# Patient Record
Sex: MALE | Race: WHITE | NOT HISPANIC OR LATINO | Employment: OTHER | URBAN - METROPOLITAN AREA
[De-identification: names, ages, dates, MRNs, and addresses within clinical notes are randomized per-mention and may not be internally consistent; named-entity substitution may affect disease eponyms.]

---

## 2024-01-01 ENCOUNTER — TRANSITIONAL CARE UNIT VISIT (OUTPATIENT)
Dept: GERIATRICS | Facility: CLINIC | Age: 89
End: 2024-01-01
Payer: MEDICARE

## 2024-01-01 ENCOUNTER — LAB REQUISITION (OUTPATIENT)
Dept: LAB | Facility: CLINIC | Age: 89
End: 2024-01-01
Payer: MEDICARE

## 2024-01-01 ENCOUNTER — NURSING HOME VISIT (OUTPATIENT)
Dept: GERIATRICS | Facility: CLINIC | Age: 89
End: 2024-01-01
Payer: MEDICARE

## 2024-01-01 ENCOUNTER — TELEPHONE (OUTPATIENT)
Dept: GERIATRICS | Facility: CLINIC | Age: 89
End: 2024-01-01
Payer: MEDICARE

## 2024-01-01 ENCOUNTER — MEDICAL CORRESPONDENCE (OUTPATIENT)
Dept: HEALTH INFORMATION MANAGEMENT | Facility: CLINIC | Age: 89
End: 2024-01-01
Payer: MEDICARE

## 2024-01-01 ENCOUNTER — TELEPHONE (OUTPATIENT)
Dept: FAMILY MEDICINE | Facility: OTHER | Age: 89
End: 2024-01-01
Payer: MEDICARE

## 2024-01-01 ENCOUNTER — NURSING HOME VISIT (OUTPATIENT)
Dept: GERIATRICS | Facility: CLINIC | Age: 89
End: 2024-01-01
Payer: COMMERCIAL

## 2024-01-01 ENCOUNTER — PATIENT OUTREACH (OUTPATIENT)
Dept: GERIATRIC MEDICINE | Facility: CLINIC | Age: 89
End: 2024-01-01
Payer: MEDICARE

## 2024-01-01 ENCOUNTER — DOCUMENTATION ONLY (OUTPATIENT)
Dept: OTHER | Facility: CLINIC | Age: 89
End: 2024-01-01
Payer: MEDICARE

## 2024-01-01 ENCOUNTER — DOCUMENTATION ONLY (OUTPATIENT)
Dept: GERIATRICS | Facility: CLINIC | Age: 89
End: 2024-01-01
Payer: MEDICARE

## 2024-01-01 ENCOUNTER — LAB REQUISITION (OUTPATIENT)
Dept: LAB | Facility: CLINIC | Age: 89
End: 2024-01-01
Payer: COMMERCIAL

## 2024-01-01 ENCOUNTER — DOCUMENTATION ONLY (OUTPATIENT)
Dept: GERIATRICS | Facility: CLINIC | Age: 89
End: 2024-01-01

## 2024-01-01 ENCOUNTER — TELEPHONE (OUTPATIENT)
Dept: GERIATRICS | Facility: CLINIC | Age: 89
End: 2024-01-01

## 2024-01-01 VITALS
OXYGEN SATURATION: 90 % | RESPIRATION RATE: 18 BRPM | HEART RATE: 56 BPM | WEIGHT: 173.2 LBS | SYSTOLIC BLOOD PRESSURE: 147 MMHG | TEMPERATURE: 97.8 F | BODY MASS INDEX: 25.58 KG/M2 | DIASTOLIC BLOOD PRESSURE: 62 MMHG

## 2024-01-01 VITALS
HEART RATE: 88 BPM | TEMPERATURE: 98 F | DIASTOLIC BLOOD PRESSURE: 76 MMHG | HEIGHT: 69 IN | RESPIRATION RATE: 18 BRPM | OXYGEN SATURATION: 95 % | WEIGHT: 171.4 LBS | SYSTOLIC BLOOD PRESSURE: 109 MMHG | BODY MASS INDEX: 25.39 KG/M2

## 2024-01-01 VITALS
WEIGHT: 171 LBS | TEMPERATURE: 97.3 F | DIASTOLIC BLOOD PRESSURE: 59 MMHG | BODY MASS INDEX: 25.25 KG/M2 | HEART RATE: 59 BPM | RESPIRATION RATE: 16 BRPM | OXYGEN SATURATION: 95 % | SYSTOLIC BLOOD PRESSURE: 102 MMHG

## 2024-01-01 VITALS
HEIGHT: 69 IN | DIASTOLIC BLOOD PRESSURE: 70 MMHG | HEART RATE: 70 BPM | WEIGHT: 171.4 LBS | OXYGEN SATURATION: 96 % | BODY MASS INDEX: 25.39 KG/M2 | SYSTOLIC BLOOD PRESSURE: 165 MMHG | TEMPERATURE: 97.5 F | RESPIRATION RATE: 15 BRPM

## 2024-01-01 VITALS
DIASTOLIC BLOOD PRESSURE: 63 MMHG | SYSTOLIC BLOOD PRESSURE: 133 MMHG | RESPIRATION RATE: 15 BRPM | TEMPERATURE: 97.5 F | WEIGHT: 174 LBS | HEART RATE: 64 BPM | BODY MASS INDEX: 25.7 KG/M2 | OXYGEN SATURATION: 98 %

## 2024-01-01 VITALS
DIASTOLIC BLOOD PRESSURE: 66 MMHG | HEART RATE: 76 BPM | OXYGEN SATURATION: 95 % | WEIGHT: 186.4 LBS | RESPIRATION RATE: 16 BRPM | SYSTOLIC BLOOD PRESSURE: 121 MMHG | BODY MASS INDEX: 27.53 KG/M2 | TEMPERATURE: 97.7 F

## 2024-01-01 VITALS
HEART RATE: 61 BPM | RESPIRATION RATE: 18 BRPM | BODY MASS INDEX: 25.58 KG/M2 | SYSTOLIC BLOOD PRESSURE: 154 MMHG | OXYGEN SATURATION: 96 % | WEIGHT: 173.2 LBS | DIASTOLIC BLOOD PRESSURE: 72 MMHG | TEMPERATURE: 97.7 F

## 2024-01-01 VITALS
OXYGEN SATURATION: 91 % | WEIGHT: 164 LBS | SYSTOLIC BLOOD PRESSURE: 117 MMHG | HEART RATE: 66 BPM | RESPIRATION RATE: 15 BRPM | TEMPERATURE: 97.8 F | BODY MASS INDEX: 24.22 KG/M2 | DIASTOLIC BLOOD PRESSURE: 56 MMHG

## 2024-01-01 VITALS
SYSTOLIC BLOOD PRESSURE: 133 MMHG | TEMPERATURE: 97.5 F | RESPIRATION RATE: 15 BRPM | HEART RATE: 64 BPM | DIASTOLIC BLOOD PRESSURE: 63 MMHG | BODY MASS INDEX: 24.29 KG/M2 | HEIGHT: 69 IN | OXYGEN SATURATION: 98 % | WEIGHT: 164 LBS

## 2024-01-01 VITALS
RESPIRATION RATE: 16 BRPM | TEMPERATURE: 97.3 F | DIASTOLIC BLOOD PRESSURE: 63 MMHG | HEART RATE: 80 BPM | OXYGEN SATURATION: 95 % | WEIGHT: 173.2 LBS | BODY MASS INDEX: 25.58 KG/M2 | SYSTOLIC BLOOD PRESSURE: 125 MMHG

## 2024-01-01 VITALS
TEMPERATURE: 97.3 F | HEART RATE: 62 BPM | SYSTOLIC BLOOD PRESSURE: 154 MMHG | DIASTOLIC BLOOD PRESSURE: 66 MMHG | RESPIRATION RATE: 16 BRPM | BODY MASS INDEX: 24.69 KG/M2 | OXYGEN SATURATION: 97 % | WEIGHT: 167.2 LBS

## 2024-01-01 VITALS
HEART RATE: 64 BPM | DIASTOLIC BLOOD PRESSURE: 63 MMHG | WEIGHT: 166.8 LBS | BODY MASS INDEX: 24.63 KG/M2 | OXYGEN SATURATION: 98 % | RESPIRATION RATE: 15 BRPM | TEMPERATURE: 97.5 F | SYSTOLIC BLOOD PRESSURE: 133 MMHG

## 2024-01-01 VITALS
OXYGEN SATURATION: 92 % | BODY MASS INDEX: 26.99 KG/M2 | DIASTOLIC BLOOD PRESSURE: 66 MMHG | SYSTOLIC BLOOD PRESSURE: 130 MMHG | WEIGHT: 182.8 LBS | HEART RATE: 65 BPM | RESPIRATION RATE: 18 BRPM | TEMPERATURE: 97.3 F

## 2024-01-01 VITALS
DIASTOLIC BLOOD PRESSURE: 75 MMHG | WEIGHT: 173.2 LBS | RESPIRATION RATE: 22 BRPM | SYSTOLIC BLOOD PRESSURE: 150 MMHG | HEART RATE: 78 BPM | BODY MASS INDEX: 25.58 KG/M2 | TEMPERATURE: 97.6 F | OXYGEN SATURATION: 97 %

## 2024-01-01 VITALS
HEART RATE: 70 BPM | RESPIRATION RATE: 16 BRPM | SYSTOLIC BLOOD PRESSURE: 112 MMHG | OXYGEN SATURATION: 91 % | DIASTOLIC BLOOD PRESSURE: 56 MMHG | BODY MASS INDEX: 25.8 KG/M2 | WEIGHT: 174.2 LBS | HEIGHT: 69 IN | TEMPERATURE: 97.3 F

## 2024-01-01 VITALS
OXYGEN SATURATION: 97 % | HEART RATE: 62 BPM | DIASTOLIC BLOOD PRESSURE: 66 MMHG | SYSTOLIC BLOOD PRESSURE: 154 MMHG | WEIGHT: 167.2 LBS | TEMPERATURE: 97.3 F | RESPIRATION RATE: 16 BRPM | BODY MASS INDEX: 24.69 KG/M2

## 2024-01-01 VITALS
WEIGHT: 164 LBS | DIASTOLIC BLOOD PRESSURE: 52 MMHG | SYSTOLIC BLOOD PRESSURE: 116 MMHG | OXYGEN SATURATION: 95 % | RESPIRATION RATE: 17 BRPM | HEART RATE: 79 BPM | BODY MASS INDEX: 24.22 KG/M2 | TEMPERATURE: 97.1 F

## 2024-01-01 VITALS
HEIGHT: 69 IN | DIASTOLIC BLOOD PRESSURE: 59 MMHG | SYSTOLIC BLOOD PRESSURE: 114 MMHG | BODY MASS INDEX: 25.65 KG/M2 | RESPIRATION RATE: 17 BRPM | OXYGEN SATURATION: 96 % | TEMPERATURE: 97.9 F | HEART RATE: 66 BPM | WEIGHT: 173.2 LBS

## 2024-01-01 VITALS
WEIGHT: 174 LBS | BODY MASS INDEX: 25.7 KG/M2 | SYSTOLIC BLOOD PRESSURE: 133 MMHG | OXYGEN SATURATION: 98 % | TEMPERATURE: 97.5 F | DIASTOLIC BLOOD PRESSURE: 63 MMHG | RESPIRATION RATE: 15 BRPM | HEART RATE: 64 BPM

## 2024-01-01 VITALS
TEMPERATURE: 98 F | OXYGEN SATURATION: 95 % | WEIGHT: 173.2 LBS | BODY MASS INDEX: 25.58 KG/M2 | RESPIRATION RATE: 16 BRPM | DIASTOLIC BLOOD PRESSURE: 69 MMHG | SYSTOLIC BLOOD PRESSURE: 138 MMHG | HEART RATE: 72 BPM

## 2024-01-01 VITALS
SYSTOLIC BLOOD PRESSURE: 120 MMHG | RESPIRATION RATE: 14 BRPM | TEMPERATURE: 97.7 F | WEIGHT: 180.4 LBS | OXYGEN SATURATION: 91 % | BODY MASS INDEX: 26.64 KG/M2 | HEART RATE: 69 BPM | DIASTOLIC BLOOD PRESSURE: 61 MMHG

## 2024-01-01 VITALS
RESPIRATION RATE: 16 BRPM | OXYGEN SATURATION: 90 % | WEIGHT: 183 LBS | BODY MASS INDEX: 27.02 KG/M2 | HEART RATE: 74 BPM | DIASTOLIC BLOOD PRESSURE: 61 MMHG | TEMPERATURE: 97.2 F | SYSTOLIC BLOOD PRESSURE: 128 MMHG

## 2024-01-01 VITALS
WEIGHT: 176.4 LBS | BODY MASS INDEX: 26.05 KG/M2 | TEMPERATURE: 97.5 F | SYSTOLIC BLOOD PRESSURE: 131 MMHG | OXYGEN SATURATION: 95 % | DIASTOLIC BLOOD PRESSURE: 72 MMHG | RESPIRATION RATE: 18 BRPM | HEART RATE: 81 BPM

## 2024-01-01 VITALS
OXYGEN SATURATION: 95 % | BODY MASS INDEX: 25.55 KG/M2 | RESPIRATION RATE: 16 BRPM | WEIGHT: 173 LBS | TEMPERATURE: 97.3 F | DIASTOLIC BLOOD PRESSURE: 63 MMHG | HEART RATE: 80 BPM | SYSTOLIC BLOOD PRESSURE: 125 MMHG

## 2024-01-01 VITALS
OXYGEN SATURATION: 97 % | TEMPERATURE: 97.5 F | RESPIRATION RATE: 18 BRPM | HEART RATE: 62 BPM | SYSTOLIC BLOOD PRESSURE: 129 MMHG | DIASTOLIC BLOOD PRESSURE: 59 MMHG | WEIGHT: 181 LBS | BODY MASS INDEX: 26.73 KG/M2

## 2024-01-01 VITALS
SYSTOLIC BLOOD PRESSURE: 122 MMHG | HEART RATE: 79 BPM | DIASTOLIC BLOOD PRESSURE: 66 MMHG | OXYGEN SATURATION: 99 % | BODY MASS INDEX: 25.1 KG/M2 | RESPIRATION RATE: 18 BRPM | TEMPERATURE: 97.7 F | WEIGHT: 170 LBS

## 2024-01-01 VITALS
SYSTOLIC BLOOD PRESSURE: 142 MMHG | DIASTOLIC BLOOD PRESSURE: 99 MMHG | BODY MASS INDEX: 25.65 KG/M2 | WEIGHT: 173.2 LBS | RESPIRATION RATE: 16 BRPM | HEART RATE: 70 BPM | OXYGEN SATURATION: 93 % | HEIGHT: 69 IN | TEMPERATURE: 98.2 F

## 2024-01-01 VITALS
TEMPERATURE: 97.3 F | OXYGEN SATURATION: 95 % | DIASTOLIC BLOOD PRESSURE: 59 MMHG | SYSTOLIC BLOOD PRESSURE: 102 MMHG | HEART RATE: 59 BPM | BODY MASS INDEX: 25.33 KG/M2 | RESPIRATION RATE: 16 BRPM | WEIGHT: 171 LBS | HEIGHT: 69 IN

## 2024-01-01 DIAGNOSIS — G81.94 LEFT HEMIPLEGIA (H): ICD-10-CM

## 2024-01-01 DIAGNOSIS — I63.9 CEREBROVASCULAR ACCIDENT (CVA), UNSPECIFIED MECHANISM (H): Primary | ICD-10-CM

## 2024-01-01 DIAGNOSIS — I48.0 PAROXYSMAL ATRIAL FIBRILLATION (H): ICD-10-CM

## 2024-01-01 DIAGNOSIS — D72.829 LEUKOCYTOSIS, UNSPECIFIED TYPE: ICD-10-CM

## 2024-01-01 DIAGNOSIS — G81.94 LEFT HEMIPLEGIA (H): Primary | ICD-10-CM

## 2024-01-01 DIAGNOSIS — R41.0 CONFUSION: ICD-10-CM

## 2024-01-01 DIAGNOSIS — R52 GENERALIZED PAIN: ICD-10-CM

## 2024-01-01 DIAGNOSIS — I10 HYPERTENSION, UNSPECIFIED TYPE: ICD-10-CM

## 2024-01-01 DIAGNOSIS — R53.83 LETHARGY: ICD-10-CM

## 2024-01-01 DIAGNOSIS — I10 ESSENTIAL HYPERTENSION: ICD-10-CM

## 2024-01-01 DIAGNOSIS — G47.00 INSOMNIA, UNSPECIFIED TYPE: ICD-10-CM

## 2024-01-01 DIAGNOSIS — R60.9 EDEMA, UNSPECIFIED TYPE: ICD-10-CM

## 2024-01-01 DIAGNOSIS — L29.9 ITCHING: ICD-10-CM

## 2024-01-01 DIAGNOSIS — R76.12 NONSPECIFIC REACTION TO CELL MEDIATED IMMUNITY MEASUREMENT OF GAMMA INTERFERON ANTIGEN RESPONSE WITHOUT ACTIVE TUBERCULOSIS: ICD-10-CM

## 2024-01-01 DIAGNOSIS — R44.3 HALLUCINATIONS: ICD-10-CM

## 2024-01-01 DIAGNOSIS — R13.10 DYSPHAGIA, UNSPECIFIED TYPE: ICD-10-CM

## 2024-01-01 DIAGNOSIS — R05.2 SUBACUTE COUGH: ICD-10-CM

## 2024-01-01 DIAGNOSIS — D64.9 ANEMIA, UNSPECIFIED: ICD-10-CM

## 2024-01-01 DIAGNOSIS — E78.5 HYPERLIPIDEMIA, UNSPECIFIED HYPERLIPIDEMIA TYPE: ICD-10-CM

## 2024-01-01 DIAGNOSIS — R40.4 UNRESPONSIVE EPISODE: ICD-10-CM

## 2024-01-01 DIAGNOSIS — Z51.5 HOSPICE CARE PATIENT: ICD-10-CM

## 2024-01-01 DIAGNOSIS — R04.0 EPISTAXIS: ICD-10-CM

## 2024-01-01 DIAGNOSIS — N18.9 CHRONIC KIDNEY DISEASE, UNSPECIFIED: ICD-10-CM

## 2024-01-01 DIAGNOSIS — M62.81 GENERALIZED MUSCLE WEAKNESS: ICD-10-CM

## 2024-01-01 DIAGNOSIS — R63.4 WEIGHT LOSS: ICD-10-CM

## 2024-01-01 DIAGNOSIS — K59.01 SLOW TRANSIT CONSTIPATION: ICD-10-CM

## 2024-01-01 DIAGNOSIS — F32.4 MAJOR DEPRESSIVE DISORDER IN PARTIAL REMISSION, UNSPECIFIED WHETHER RECURRENT (H): ICD-10-CM

## 2024-01-01 DIAGNOSIS — I48.0 PAROXYSMAL ATRIAL FIBRILLATION (H): Primary | ICD-10-CM

## 2024-01-01 LAB
ANION GAP SERPL CALCULATED.3IONS-SCNC: 10 MMOL/L (ref 7–15)
ANION GAP SERPL CALCULATED.3IONS-SCNC: 11 MMOL/L (ref 7–15)
ANION GAP SERPL CALCULATED.3IONS-SCNC: 14 MMOL/L (ref 7–15)
BUN SERPL-MCNC: 19.1 MG/DL (ref 8–23)
BUN SERPL-MCNC: 22.3 MG/DL (ref 8–23)
BUN SERPL-MCNC: 30 MG/DL (ref 8–23)
CALCIUM SERPL-MCNC: 9.6 MG/DL (ref 8.2–9.6)
CALCIUM SERPL-MCNC: 9.9 MG/DL (ref 8.2–9.6)
CALCIUM SERPL-MCNC: 9.9 MG/DL (ref 8.8–10.4)
CHLORIDE SERPL-SCNC: 100 MMOL/L (ref 98–107)
CHLORIDE SERPL-SCNC: 101 MMOL/L (ref 98–107)
CHLORIDE SERPL-SCNC: 103 MMOL/L (ref 98–107)
CREAT SERPL-MCNC: 0.98 MG/DL (ref 0.67–1.17)
CREAT SERPL-MCNC: 1.07 MG/DL (ref 0.67–1.17)
CREAT SERPL-MCNC: 1.13 MG/DL (ref 0.67–1.17)
DEPRECATED HCO3 PLAS-SCNC: 22 MMOL/L (ref 22–29)
DEPRECATED HCO3 PLAS-SCNC: 25 MMOL/L (ref 22–29)
EGFRCR SERPLBLD CKD-EPI 2021: 60 ML/MIN/1.73M2
EGFRCR SERPLBLD CKD-EPI 2021: 64 ML/MIN/1.73M2
EGFRCR SERPLBLD CKD-EPI 2021: 71 ML/MIN/1.73M2
ERYTHROCYTE [DISTWIDTH] IN BLOOD BY AUTOMATED COUNT: 13 % (ref 10–15)
ERYTHROCYTE [DISTWIDTH] IN BLOOD BY AUTOMATED COUNT: 14.2 % (ref 10–15)
ERYTHROCYTE [DISTWIDTH] IN BLOOD BY AUTOMATED COUNT: 14.3 % (ref 10–15)
GAMMA INTERFERON BACKGROUND BLD IA-ACNC: 0.05 IU/ML
GLUCOSE SERPL-MCNC: 74 MG/DL (ref 70–99)
GLUCOSE SERPL-MCNC: 80 MG/DL (ref 70–99)
GLUCOSE SERPL-MCNC: 91 MG/DL (ref 70–99)
HCO3 SERPL-SCNC: 19 MMOL/L (ref 22–29)
HCT VFR BLD AUTO: 40 % (ref 40–53)
HCT VFR BLD AUTO: 45 % (ref 40–53)
HCT VFR BLD AUTO: 45.7 % (ref 40–53)
HGB BLD-MCNC: 12.9 G/DL (ref 13.3–17.7)
HGB BLD-MCNC: 14.5 G/DL (ref 13.3–17.7)
HGB BLD-MCNC: 14.8 G/DL (ref 13.3–17.7)
M TB IFN-G BLD-IMP: NEGATIVE
M TB IFN-G CD4+ BCKGRND COR BLD-ACNC: 6.98 IU/ML
MCH RBC QN AUTO: 29.5 PG (ref 26.5–33)
MCH RBC QN AUTO: 30.8 PG (ref 26.5–33)
MCH RBC QN AUTO: 30.9 PG (ref 26.5–33)
MCHC RBC AUTO-ENTMCNC: 32.2 G/DL (ref 31.5–36.5)
MCHC RBC AUTO-ENTMCNC: 32.3 G/DL (ref 31.5–36.5)
MCHC RBC AUTO-ENTMCNC: 32.4 G/DL (ref 31.5–36.5)
MCV RBC AUTO: 92 FL (ref 78–100)
MCV RBC AUTO: 95 FL (ref 78–100)
MCV RBC AUTO: 96 FL (ref 78–100)
MITOGEN IGNF BCKGRD COR BLD-ACNC: -0.01 IU/ML
MITOGEN IGNF BCKGRD COR BLD-ACNC: -0.01 IU/ML
PLATELET # BLD AUTO: 215 10E3/UL (ref 150–450)
PLATELET # BLD AUTO: 222 10E3/UL (ref 150–450)
PLATELET # BLD AUTO: 261 10E3/UL (ref 150–450)
POTASSIUM SERPL-SCNC: 4.4 MMOL/L (ref 3.4–5.3)
POTASSIUM SERPL-SCNC: 4.7 MMOL/L (ref 3.4–5.3)
POTASSIUM SERPL-SCNC: 4.7 MMOL/L (ref 3.4–5.3)
QUANTIFERON MITOGEN: 7.03 IU/ML
QUANTIFERON NIL TUBE: 0.05 IU/ML
QUANTIFERON TB1 TUBE: 0.04 IU/ML
QUANTIFERON TB2 TUBE: 0.04
RBC # BLD AUTO: 4.37 10E6/UL (ref 4.4–5.9)
RBC # BLD AUTO: 4.69 10E6/UL (ref 4.4–5.9)
RBC # BLD AUTO: 4.81 10E6/UL (ref 4.4–5.9)
SODIUM SERPL-SCNC: 133 MMOL/L (ref 135–145)
SODIUM SERPL-SCNC: 136 MMOL/L (ref 135–145)
SODIUM SERPL-SCNC: 136 MMOL/L (ref 135–145)
TSH SERPL DL<=0.005 MIU/L-ACNC: 5.55 UIU/ML (ref 0.3–4.2)
WBC # BLD AUTO: 13.9 10E3/UL (ref 4–11)
WBC # BLD AUTO: 14.3 10E3/UL (ref 4–11)
WBC # BLD AUTO: 14.7 10E3/UL (ref 4–11)

## 2024-01-01 PROCEDURE — P9603 ONE-WAY ALLOW PRORATED MILES: HCPCS | Performed by: NURSE PRACTITIONER

## 2024-01-01 PROCEDURE — 99309 SBSQ NF CARE MODERATE MDM 30: CPT | Performed by: NURSE PRACTITIONER

## 2024-01-01 PROCEDURE — 80048 BASIC METABOLIC PNL TOTAL CA: CPT | Performed by: NURSE PRACTITIONER

## 2024-01-01 PROCEDURE — 86481 TB AG RESPONSE T-CELL SUSP: CPT | Mod: ORL | Performed by: FAMILY MEDICINE

## 2024-01-01 PROCEDURE — 84443 ASSAY THYROID STIM HORMONE: CPT | Mod: ORL | Performed by: NURSE PRACTITIONER

## 2024-01-01 PROCEDURE — 85027 COMPLETE CBC AUTOMATED: CPT | Performed by: NURSE PRACTITIONER

## 2024-01-01 PROCEDURE — 36415 COLL VENOUS BLD VENIPUNCTURE: CPT | Performed by: NURSE PRACTITIONER

## 2024-01-01 PROCEDURE — P9603 ONE-WAY ALLOW PRORATED MILES: HCPCS | Mod: ORL | Performed by: NURSE PRACTITIONER

## 2024-01-01 PROCEDURE — 36415 COLL VENOUS BLD VENIPUNCTURE: CPT | Mod: ORL | Performed by: NURSE PRACTITIONER

## 2024-01-01 PROCEDURE — 80048 BASIC METABOLIC PNL TOTAL CA: CPT | Mod: ORL | Performed by: NURSE PRACTITIONER

## 2024-01-01 PROCEDURE — 99305 1ST NF CARE MODERATE MDM 35: CPT | Performed by: FAMILY MEDICINE

## 2024-01-01 PROCEDURE — 99309 SBSQ NF CARE MODERATE MDM 30: CPT | Performed by: FAMILY MEDICINE

## 2024-01-01 PROCEDURE — 36415 COLL VENOUS BLD VENIPUNCTURE: CPT | Mod: ORL | Performed by: FAMILY MEDICINE

## 2024-01-01 PROCEDURE — P9603 ONE-WAY ALLOW PRORATED MILES: HCPCS | Mod: ORL | Performed by: FAMILY MEDICINE

## 2024-01-01 PROCEDURE — 85027 COMPLETE CBC AUTOMATED: CPT | Mod: ORL | Performed by: NURSE PRACTITIONER

## 2024-01-01 RX ORDER — AMOXICILLIN 500 MG
1200 CAPSULE ORAL DAILY
COMMUNITY
Start: 2024-01-01 | End: 2024-01-01

## 2024-01-01 RX ORDER — POLYETHYLENE GLYCOL 3350 17 G/17G
1 POWDER, FOR SOLUTION ORAL DAILY PRN
COMMUNITY

## 2024-01-01 RX ORDER — MORPHINE SULFATE 30 MG/1
5 TABLET ORAL EVERY 4 HOURS PRN
Status: SHIPPED
Start: 2024-01-01 | End: 2024-01-01

## 2024-01-01 RX ORDER — SENNOSIDES 8.6 MG
1 TABLET ORAL 2 TIMES DAILY
Status: SHIPPED
Start: 2024-01-01

## 2024-01-01 RX ORDER — LANOLIN ALCOHOL/MO/W.PET/CERES
3 CREAM (GRAM) TOPICAL
COMMUNITY
Start: 2024-01-01 | End: 2024-01-01

## 2024-01-01 RX ORDER — ACETAMINOPHEN 500 MG
1000 TABLET ORAL 3 TIMES DAILY
COMMUNITY
Start: 2024-01-01

## 2024-01-01 RX ORDER — HYDROXYZINE HYDROCHLORIDE 25 MG/1
25 TABLET, FILM COATED ORAL 4 TIMES DAILY
Status: SHIPPED
Start: 2024-01-01 | End: 2024-01-01

## 2024-01-01 RX ORDER — GUAIFENESIN 200 MG/10ML
200 LIQUID ORAL EVERY 4 HOURS PRN
COMMUNITY

## 2024-01-01 RX ORDER — CETIRIZINE HYDROCHLORIDE 10 MG/1
10 TABLET ORAL DAILY
Status: SHIPPED
Start: 2024-01-01 | End: 2024-01-01 | Stop reason: ALTCHOICE

## 2024-01-01 RX ORDER — MORPHINE SULFATE 30 MG/1
5 TABLET ORAL 4 TIMES DAILY
Status: SHIPPED
Start: 2024-01-01

## 2024-01-01 RX ORDER — ASPIRIN 325 MG
325 TABLET ORAL DAILY
Status: SHIPPED
Start: 2024-01-01

## 2024-01-01 RX ORDER — HYDROXYZINE HYDROCHLORIDE 25 MG/1
25 TABLET, FILM COATED ORAL 3 TIMES DAILY
Status: SHIPPED
Start: 2024-01-01 | End: 2024-01-01

## 2024-01-01 RX ORDER — OXYMETAZOLINE HYDROCHLORIDE 0.05 G/100ML
2 SPRAY NASAL 2 TIMES DAILY PRN
Start: 2024-01-01

## 2024-01-01 RX ORDER — LISINOPRIL 10 MG/1
10 TABLET ORAL DAILY
Start: 2024-01-01

## 2024-01-01 RX ORDER — TRAZODONE HYDROCHLORIDE 50 MG/1
25 TABLET, FILM COATED ORAL AT BEDTIME
COMMUNITY
Start: 2024-01-01

## 2024-01-01 RX ORDER — HYDROXYZINE HYDROCHLORIDE 25 MG/1
50 TABLET, FILM COATED ORAL 4 TIMES DAILY
Status: SHIPPED
Start: 2024-01-01

## 2024-01-01 RX ORDER — METOPROLOL TARTRATE 25 MG/1
25 TABLET, FILM COATED ORAL 2 TIMES DAILY
COMMUNITY
Start: 2024-01-01

## 2024-01-01 RX ORDER — VITAMIN B COMPLEX
1 TABLET ORAL DAILY
COMMUNITY
Start: 2024-01-01 | End: 2024-01-01

## 2024-01-01 RX ORDER — ALBUTEROL SULFATE 90 UG/1
1-2 AEROSOL, METERED RESPIRATORY (INHALATION) EVERY 4 HOURS PRN
COMMUNITY
Start: 2024-01-01

## 2024-01-01 RX ORDER — CALCIUM CARBONATE/VITAMIN D3 500MG-5MCG
1 TABLET ORAL DAILY
COMMUNITY
Start: 2024-01-01 | End: 2024-01-01

## 2024-01-01 ASSESSMENT — ENCOUNTER SYMPTOMS
FEVER: 0
DIARRHEA: 0
COUGH: 0
SHORTNESS OF BREATH: 0
ABDOMINAL PAIN: 0
DIARRHEA: 0
CONSTIPATION: 0
FEVER: 0
NAUSEA: 0
CHILLS: 0
CONSTIPATION: 0
CHILLS: 0
APPETITE CHANGE: 0
SHORTNESS OF BREATH: 0
COUGH: 0
APPETITE CHANGE: 0
WEAKNESS: 1
WEAKNESS: 1
NAUSEA: 0
ARTHRALGIAS: 0
ABDOMINAL PAIN: 0
ARTHRALGIAS: 0

## 2024-01-01 ASSESSMENT — ACTIVITIES OF DAILY LIVING (ADL)
DEPENDENT_IADLS:: CLEANING;COOKING;LAUNDRY;SHOPPING;MEAL PREPARATION;MEDICATION MANAGEMENT;MONEY MANAGEMENT;TRANSPORTATION;INCONTINENCE

## 2024-01-24 PROBLEM — G45.9 TIA (TRANSIENT ISCHEMIC ATTACK): Status: ACTIVE | Noted: 2020-08-17

## 2024-01-24 PROBLEM — H90.3 SENSORINEURAL HEARING LOSS, BILATERAL: Status: ACTIVE | Noted: 2020-08-12

## 2024-01-24 PROBLEM — K21.9 GASTROESOPHAGEAL REFLUX DISEASE: Status: ACTIVE | Noted: 2020-08-12

## 2024-01-24 PROBLEM — I48.0 PAROXYSMAL ATRIAL FIBRILLATION (H): Status: ACTIVE | Noted: 2020-08-17

## 2024-01-24 NOTE — PROGRESS NOTES
[facility] GERIATRICS  Primary Care Provider & Clinic: Bradley Bean MD, [address]  / [facility] [address]  Chief Complaint   Patient presents with    Hospital F/U     [place] 1/20/2024 - 1/24/2024     [place] Medical Record Number: [id number]  Place of Service Where Encounter Took Place: [facility] ([facility]) [8]    Johnson Acosta is a 89 year old (5/22/1929), admitted to the above facility from  [facility]. Hospital stay 1/20/24 through 1/24/24.    HPI:    Acute CVA right ventral messi  Patient presented with left-sided weakness and left facial droop. Had a fall on 1/19/2024. Exact time of onset of symptoms is unknown. History of A-fib, on Xarelto.  MRI brain with suspected acute infarction within right ventral messi. No associated hemorrhage.  MRA head with no large vessel occlusion. Moderate focal stenosis mid basilar artery. MRA neck unremarkable.    - No indication for aspirin per neurology  - Continue PTA Xarelto   -Increase to high intensity statin  -Hemoglobin A1c showed no evidence of diabetes on admission  -Fasting lipid panel showed poor control on prior to admission statin  - PT/OT/ST eval  - Echocardiogram with preserved LV function.  - No further need for permissive hypertension  -CT head on 1/21 due to worsening left-sided facial droop showed no evidence of hemorrhagic conversion    Stress-induced leukocytosis  Chronic leukocytosis  WBC count 17.4, was 11.6 on 1/19/2024.  No focal signs and symptoms of infection. No fevers. Improving to 13.5 without antibiotics. Patient had negative UA. This is likely stress-induced in the setting of stroke. Patient has had chronic leukocytosis since 2006 ranging from 11.3-14. Etiology unclear.    Paroxysmal atrial fibrillation  - Rate controlled  - Continue Xarelto  - History of thyroid disorder per records, TSH normal on admission    Hypertension  Resume amlodipine.    Hyperlipidemia  - Discontinue simvastatin  -Initiate high-dose atorvastatin      Patient seen today for follow up, oriented, mild cognitive limitations, adequate historian, slurred speech, meal supervision, L hemiplegia with AL no use and LLE 75% strength, appears healthy, with daughter changed code status to full, no distress.     CODE STATUS/ADVANCE DIRECTIVES DISCUSSION: No Order - CPR/Full code   Patient's living condition: lives alone  ALLERGIES:   Allergies   Allergen Reactions    Seasonal Allergies Other (See Comments)     Watery eyes      PAST MEDICAL HISTORY: No past medical history on file.   PAST SURGICAL HISTORY:  has a past surgical history that includes REMV CATARACT EXTRACAP,INSERT LENS, W/O ECP (5/22/2003) and Laser YAG capsulotomy (7/21/2011).  FAMILY HISTORY: family history is not on file.  SOCIAL HISTORY:      Post Discharge Medication Reconciliation Status:  MED REC REQUIRED  Post Medication Reconciliation Status: discharge medications reconciled, continue medications without change    Current Outpatient Medications   Medication Sig    acetaminophen (TYLENOL) 500 MG tablet Take 1,000 mg by mouth 3 times daily    albuterol (PROAIR HFA/PROVENTIL HFA/VENTOLIN HFA) 108 (90 Base) MCG/ACT inhaler Inhale 1-2 puffs into the lungs every 4 hours as needed for shortness of breath, wheezing or cough    amLODIPine (NORVASC) 10 MG tablet Take 10 mg by mouth daily    aspirin (ASA) 81 MG chewable tablet Take 81 mg by mouth daily    atorvastatin (LIPITOR) 20 MG tablet Take 40 mg by mouth daily    Calcium Carb-Cholecalciferol 500-5 MG-MCG TABS Take 1 tablet by mouth daily    lisinopril (ZESTRIL) 20 MG tablet Take 20 mg by mouth daily    melatonin 3 MG tablet Take 3 mg by mouth nightly as needed for sleep    metoprolol tartrate (LOPRESSOR) 25 MG tablet Take 25 mg by mouth 2 times daily    Multiple Vitamin (MULTIVITAMIN OR) Take 1 tablet by mouth daily    Omega-3 Fatty Acids (FISH OIL) 1200 MG capsule Take 1,200 mg by mouth daily    rivaroxaban ANTICOAGULANT (XARELTO) 15 MG TABS tablet Take  "15 mg by mouth daily    traZODone (DESYREL) 50 MG tablet Take 25 mg by mouth nightly as needed for sleep    Vitamin D3 (CHOLECALCIFEROL) 25 mcg (1000 units) tablet Take 1 tablet by mouth daily     No current facility-administered medications for this visit.     ROS:  4 point ROS including Respiratory, CV, GI and , other than that noted in the HPI,  is negative    Vitals:  /76   Pulse 88   Temp 98  F (36.7  C)   Resp 18   Ht 1.753 m (5' 9\")   Wt 77.7 kg (171 lb 6.4 oz)   SpO2 95%   BMI 25.31 kg/m    Exam:  GENERAL APPEARANCE:  in no distress, appears healthy  ENT:  Mouth and posterior oropharynx normal, moist mucous membranes  RESP:  lungs clear to auscultation , no respiratory distress  CV:  regular rate and rhythm, no murmur, rub, or gallop, no edema  ABDOMEN:  bowel sounds normal  M/S:   Gait and station abnormal transfer assist  SKIN:  Inspection of skin and subcutaneous tissue baseline  NEURO:   Examination of sensation by touch normal  PSYCH:  affect and mood normal    Lab/Diagnostic Data:  Recent labs in [facility] reviewed by me today.     ASSESSMENT/PLAN:  (I63.9) Cerebrovascular accident (CVA), unspecified mechanism (H)  (primary encounter diagnosis)  (G81.94) Left hemiplegia (H)  (R13.10) Dysphagia, unspecified type  (M62.81) Generalized muscle weakness  Comment: post CVA, L hemiplegia, altered diet, deconditioned  Plan:   -PT/OT eval and treat  -clarify diet soft/bite size, medium thick fluids  -CBC, BMP on 1/29  -start APAP TID PRN for pain  -continue xarelto, amlodipine, metoprolol, lisinopril and atorvastatin  -consider discontinue ASA81; checked chart notes  -follow up [facility] in 6-8 weeks    (D72.829) Leukocytosis, unspecified type  Comment: high 17.4 then trended down  Plan: repeat CBC on 1/29    (I48.0) Paroxysmal atrial fibrillation (H)  Comment: today RRR, denies CP  Plan: continue xarelto, statin    (I10) Hypertension, unspecified type  Comment: SBP's in the 110 range  Plan: staff " to check VS daily        Electronically signed by: BRAYDEN Storey CNP

## 2024-01-25 PROBLEM — I10 HYPERTENSION, UNSPECIFIED TYPE: Status: ACTIVE | Noted: 2024-01-01

## 2024-01-25 PROBLEM — R13.10 DYSPHAGIA, UNSPECIFIED TYPE: Status: ACTIVE | Noted: 2024-01-01

## 2024-01-25 PROBLEM — G81.94 LEFT HEMIPLEGIA (H): Status: ACTIVE | Noted: 2024-01-01

## 2024-01-25 PROBLEM — I63.9 CEREBROVASCULAR ACCIDENT (CVA), UNSPECIFIED MECHANISM (H): Status: ACTIVE | Noted: 2024-01-01

## 2024-01-25 PROBLEM — D72.829 LEUKOCYTOSIS, UNSPECIFIED TYPE: Status: ACTIVE | Noted: 2024-01-01

## 2024-01-25 PROBLEM — M62.81 GENERALIZED MUSCLE WEAKNESS: Status: ACTIVE | Noted: 2024-01-01

## 2024-01-25 NOTE — LETTER
1/25/2024        RE: Johnson Acosta  [address]  [facility] [address]        [facility] GERIATRICS  Primary Care Provider & Clinic: Bradley Bean MD, [address] / [facility] [address]  Chief Complaint   Patient presents with     Hospital F/U     [place] 1/20/2024 - 1/24/2024     [place] Medical Record Number: [id number]  Place of Service Where Encounter Took Place: [facility] ([facility]) [8]    Johnson Acosta is a 89 year old (5/22/1929), admitted to the above facility from  [facility]. Hospital stay 1/20/24 through 1/24/24.    HPI:    Acute CVA right ventral messi  Patient presented with left-sided weakness and left facial droop. Had a fall on 1/19/2024. Exact time of onset of symptoms is unknown. History of A-fib, on Xarelto.  MRI brain with suspected acute infarction within right ventral messi. No associated hemorrhage.  MRA head with no large vessel occlusion. Moderate focal stenosis mid basilar artery. MRA neck unremarkable.    - No indication for aspirin per neurology  - Continue PTA Xarelto   -Increase to high intensity statin  -Hemoglobin A1c showed no evidence of diabetes on admission  -Fasting lipid panel showed poor control on prior to admission statin  - PT/OT/ST eval  - Echocardiogram with preserved LV function.  - No further need for permissive hypertension  -CT head on 1/21 due to worsening left-sided facial droop showed no evidence of hemorrhagic conversion    Stress-induced leukocytosis  Chronic leukocytosis  WBC count 17.4, was 11.6 on 1/19/2024.  No focal signs and symptoms of infection. No fevers. Improving to 13.5 without antibiotics. Patient had negative UA. This is likely stress-induced in the setting of stroke. Patient has had chronic leukocytosis since 2006 ranging from 11.3-14. Etiology unclear.    Paroxysmal atrial fibrillation  - Rate controlled  - Continue Xarelto  - History of thyroid disorder per records, TSH normal on  admission    Hypertension  Resume amlodipine.    Hyperlipidemia  - Discontinue simvastatin  -Initiate high-dose atorvastatin     Patient seen today for follow up, oriented, mild cognitive limitations, adequate historian, slurred speech, meal supervision, L hemiplegia with AL no use and LLE 75% strength, appears healthy, with daughter changed code status to full, no distress.     CODE STATUS/ADVANCE DIRECTIVES DISCUSSION: No Order - CPR/Full code   Patient's living condition: lives alone  ALLERGIES:   Allergies   Allergen Reactions     Seasonal Allergies Other (See Comments)     Watery eyes      PAST MEDICAL HISTORY: No past medical history on file.   PAST SURGICAL HISTORY:  has a past surgical history that includes REMV CATARACT EXTRACAP,INSERT LENS, W/O ECP (5/22/2003) and Laser YAG capsulotomy (7/21/2011).  FAMILY HISTORY: family history is not on file.  SOCIAL HISTORY:      Post Discharge Medication Reconciliation Status:  MED REC REQUIRED  Post Medication Reconciliation Status: discharge medications reconciled, continue medications without change    Current Outpatient Medications   Medication Sig     acetaminophen (TYLENOL) 500 MG tablet Take 1,000 mg by mouth 3 times daily     albuterol (PROAIR HFA/PROVENTIL HFA/VENTOLIN HFA) 108 (90 Base) MCG/ACT inhaler Inhale 1-2 puffs into the lungs every 4 hours as needed for shortness of breath, wheezing or cough     amLODIPine (NORVASC) 10 MG tablet Take 10 mg by mouth daily     aspirin (ASA) 81 MG chewable tablet Take 81 mg by mouth daily     atorvastatin (LIPITOR) 20 MG tablet Take 40 mg by mouth daily     Calcium Carb-Cholecalciferol 500-5 MG-MCG TABS Take 1 tablet by mouth daily     lisinopril (ZESTRIL) 20 MG tablet Take 20 mg by mouth daily     melatonin 3 MG tablet Take 3 mg by mouth nightly as needed for sleep     metoprolol tartrate (LOPRESSOR) 25 MG tablet Take 25 mg by mouth 2 times daily     Multiple Vitamin (MULTIVITAMIN OR) Take 1 tablet by mouth daily      "Omega-3 Fatty Acids (FISH OIL) 1200 MG capsule Take 1,200 mg by mouth daily     rivaroxaban ANTICOAGULANT (XARELTO) 15 MG TABS tablet Take 15 mg by mouth daily     traZODone (DESYREL) 50 MG tablet Take 25 mg by mouth nightly as needed for sleep     Vitamin D3 (CHOLECALCIFEROL) 25 mcg (1000 units) tablet Take 1 tablet by mouth daily     No current facility-administered medications for this visit.     ROS:  4 point ROS including Respiratory, CV, GI and , other than that noted in the HPI,  is negative    Vitals:  /76   Pulse 88   Temp 98  F (36.7  C)   Resp 18   Ht 1.753 m (5' 9\")   Wt 77.7 kg (171 lb 6.4 oz)   SpO2 95%   BMI 25.31 kg/m    Exam:  GENERAL APPEARANCE:  in no distress, appears healthy  ENT:  Mouth and posterior oropharynx normal, moist mucous membranes  RESP:  lungs clear to auscultation , no respiratory distress  CV:  regular rate and rhythm, no murmur, rub, or gallop, no edema  ABDOMEN:  bowel sounds normal  M/S:   Gait and station abnormal transfer assist  SKIN:  Inspection of skin and subcutaneous tissue baseline  NEURO:   Examination of sensation by touch normal  PSYCH:  affect and mood normal    Lab/Diagnostic Data:  Recent labs in [facility] reviewed by me today.     ASSESSMENT/PLAN:  (I63.9) Cerebrovascular accident (CVA), unspecified mechanism (H)  (primary encounter diagnosis)  (G81.94) Left hemiplegia (H)  (R13.10) Dysphagia, unspecified type  (M62.81) Generalized muscle weakness  Comment: post CVA, L hemiplegia, altered diet, deconditioned  Plan:   -PT/OT eval and treat  -clarify diet soft/bite size, medium thick fluids  -CBC, BMP on 1/29  -start APAP TID PRN for pain  -continue xarelto, amlodipine, metoprolol, lisinopril and atorvastatin  -consider discontinue ASA81; checked chart notes  -follow up [facility] in 6-8 weeks    (D72.829) Leukocytosis, unspecified type  Comment: high 17.4 then trended down  Plan: repeat CBC on 1/29    (I48.0) Paroxysmal atrial fibrillation " (H)  Comment: today RRR, denies CP  Plan: continue xarelto, statin    (I10) Hypertension, unspecified type  Comment: SBP's in the 110 range  Plan: staff to check VS daily        Electronically signed by: BRAYDEN Storey CNP      Sincerely,        BRAYDEN Storey CNP

## 2024-01-26 NOTE — TELEPHONE ENCOUNTER
[facility] Triage Nurse Telephone Encounter    Provider: BRAYDEN Nova CNP   Facility: [facility]  Facility Type:  TCU    Caller: Nataliia   Call Back Number: [phone]    Allergies:    Allergies   Allergen Reactions    Seasonal Allergies Other (See Comments)     Watery eyes        Reason for call: Today the nurse is calling that the patient is complaining of double vision and very weak.  Blood pressure 88/69 pulse 51 temp 97.5 O2 97% on room air.  Patient did eat and drink his breakfast this morning.  While on the phone with nursing the patient became unresponsive.    Verbal Order/Direction given by Provider: Sent to ER for evaluation    Provider giving Order:  BRAYDEN Nova CNP     Verbal Order given to: Nataliia Min RN

## 2024-01-29 NOTE — TELEPHONE ENCOUNTER
[facility] TELEPHONE ENCOUNTER        Johnson Acosta is a 89 year old  (5/22/1929),Nurse called today to report: just readmitted to facility and nurse noted two large abrasions to left knee area. Requesting orders.    ASSESSMENT/PLAN  Left knee abrasions  - orders to cleanse and apply bacitracin and foam dressing daily until healed.    Electronically signed by:   BRAYDEN Gardiner CNP

## 2024-01-29 NOTE — LETTER
"    1/29/2024        RE: Johnson Acosta  [address]  [facility] [address]        [facility] GERIATRICS  Primary Care Provider & Clinic: Bradley Bean MD, [address]  / [facility] [address]  Chief Complaint   Patient presents with     Hospital F/U     [facility] 1/26/2024 - 1/28/2024     [place] Medical Record Number: [id number]  Place of Service Where Encounter Took Place: [facility] ([facility]) [8]    Johnson Acosta is a 89 year old (5/22/1929), returned to the above facility from  [facility] . Hospital stay 1/26/24 - 1/28/24.     HPI:    Recent right pontine stroke  The patient presents with reported worsening left-sided weakness and change in vision. MRI of the head shows expansion of the recent right pontine stroke.  -Admitted patient  -Neurocheck  -Neurology consult  -Continue PTA aspirin and statin and Xarelto  -Hold ACE  -IV fluids  -PT OT    Hypotensive episode  Patient reportedly had blood pressure of 88/69 at the care facility. No hypotensive events thus far in the ED. No evidence of infectious process at this point.  -Hold PTA lisinopril  -IV fluid  -Monitor    Leukocytosis  Suspect reactive leukocytosis. No evidence of infectious process at this point.  - monitor      Paroxysmal Atrial fibrillation  - Heart rate Well controlled on current home medications: metoprolol tartrate  - Adjustments made during hospital stay: hold due to hypotensive event  - Anticoagulation plan: rivaroxaban        Patient seen for follow up, ER 1/26-28 for dizziness, VS 88/69, 51, unresponsive, today appears healthy, no distress, slurred speech is new norm after CVA, brain scan in hospital did show increase in size of the stroke \"MRI of the head shows expansion of the recent right pontine stroke\", lisinopril stopped due to low BP's, is working with therapies, L arm severe hemiplegia, no new dizziness nor falls since returning.        CODE STATUS/ADVANCE DIRECTIVES DISCUSSION: No Order - DNR " only  Patient's living condition: lives alone  ALLERGIES:   Allergies   Allergen Reactions     Seasonal Allergies Other (See Comments)     Watery eyes      PAST MEDICAL HISTORY: No past medical history on file.   PAST SURGICAL HISTORY:  has a past surgical history that includes REMV CATARACT EXTRACAP,INSERT LENS, W/O ECP (5/22/2003) and Laser YAG capsulotomy (7/21/2011).  FAMILY HISTORY: family history is not on file.  SOCIAL HISTORY:      Post Discharge Medication Reconciliation Status:  MED REC REQUIRED  Post Medication Reconciliation Status: medication reconcilation previously completed during another office visit    Current Outpatient Medications   Medication Sig     acetaminophen (TYLENOL) 500 MG tablet Take 1,000 mg by mouth 3 times daily as needed for pain     albuterol (PROAIR HFA/PROVENTIL HFA/VENTOLIN HFA) 108 (90 Base) MCG/ACT inhaler Inhale 1-2 puffs into the lungs every 4 hours as needed for shortness of breath, wheezing or cough     amLODIPine (NORVASC) 10 MG tablet Take 10 mg by mouth daily     aspirin (ASA) 81 MG chewable tablet Take 81 mg by mouth daily     atorvastatin (LIPITOR) 20 MG tablet Take 40 mg by mouth daily     Calcium Carb-Cholecalciferol 500-5 MG-MCG TABS Take 1 tablet by mouth daily     melatonin 3 MG tablet Take 3 mg by mouth nightly as needed for sleep     metoprolol tartrate (LOPRESSOR) 25 MG tablet Take 25 mg by mouth 2 times daily     Omega-3 Fatty Acids (FISH OIL) 1200 MG capsule Take 1,200 mg by mouth daily     rivaroxaban ANTICOAGULANT (XARELTO) 15 MG TABS tablet Take 15 mg by mouth daily     traZODone (DESYREL) 50 MG tablet Take 25 mg by mouth nightly as needed for sleep     Vitamin D3 (CHOLECALCIFEROL) 25 mcg (1000 units) tablet Take 1 tablet by mouth daily     lisinopril (ZESTRIL) 20 MG tablet Take 20 mg by mouth daily (Patient not taking: Reported on 1/29/2024)     Multiple Vitamin (MULTIVITAMIN OR) Take 1 tablet by mouth daily (Patient not taking: Reported on 1/29/2024)  "    No current facility-administered medications for this visit.     ROS:  4 point ROS including Respiratory, CV, GI and , other than that noted in the HPI,  is negative    Vitals:  BP (!) 165/70   Pulse 70   Temp 97.5  F (36.4  C)   Resp 15   Ht 1.753 m (5' 9\")   Wt 77.7 kg (171 lb 6.4 oz)   SpO2 96%   BMI 25.31 kg/m    Exam:  GENERAL APPEARANCE:  in no distress, appears healthy  ENT:  Mouth and posterior oropharynx normal, moist mucous membranes  RESP:  lungs clear to auscultation , no respiratory distress  CV:  regular rate and rhythm, no murmur, rub, or gallop, no edema  ABDOMEN:  bowel sounds normal  M/S:   Gait and station abnormal unsteady gait  SKIN:  Inspection of skin and subcutaneous tissue baseline  NEURO:   Examination of sensation by touch normal  PSYCH:  affect and mood normal    Lab/Diagnostic Data:  Labs done in SNF are in [place] [facility]. Please refer to them using [employer] Everywhere.    ASSESSMENT/PLAN:  (I63.9) Cerebrovascular accident (CVA), unspecified mechanism (H)  (primary encounter diagnosis)  (G81.94) Left hemiplegia (H)  (I10) Hypertension, unspecified type  Comment: post CVA, L hemiplegia, SBP's 100-160 range, denies dizziness today  Plan:   -discontinue lisinopril and MVI  -staff to check VS daily  -continue metoprolol, amlodipine, ASA, xarelto, statin  -encourage fluids  -follow up neuro 6-8 weeks        Electronically signed by: BRAYDEN Storey CNP      Sincerely,        BRAYDEN Storey CNP      "

## 2024-01-29 NOTE — PROGRESS NOTES
"[facility] GERIATRICS  Primary Care Provider & Clinic: Bradley Bean MD, [address]  / [facility] [address]  Chief Complaint   Patient presents with    Hospital F/U     [facility] 1/26/2024 - 1/28/2024     [facility] Record Number: [id number]  Place of Service Where Encounter Took Place: [facility] ([facility]) [8]    Johnson Acosta is a 89 year old (5/22/1929), returned to the above facility from  [facility] . Hospital stay 1/26/24 - 1/28/24.     HPI:    Recent right pontine stroke  The patient presents with reported worsening left-sided weakness and change in vision. MRI of the head shows expansion of the recent right pontine stroke.  -Admitted patient  -Neurocheck  -Neurology consult  -Continue PTA aspirin and statin and Xarelto  -Hold ACE  -IV fluids  -PT OT    Hypotensive episode  Patient reportedly had blood pressure of 88/69 at the care facility. No hypotensive events thus far in the ED. No evidence of infectious process at this point.  -Hold PTA lisinopril  -IV fluid  -Monitor    Leukocytosis  Suspect reactive leukocytosis. No evidence of infectious process at this point.  - monitor      Paroxysmal Atrial fibrillation  - Heart rate Well controlled on current home medications: metoprolol tartrate  - Adjustments made during hospital stay: hold due to hypotensive event  - Anticoagulation plan: rivaroxaban        Patient seen for follow up, ER 1/26-28 for dizziness, VS 88/69, 51, unresponsive, today appears healthy, no distress, slurred speech is new norm after CVA, brain scan in hospital did show increase in size of the stroke \"MRI of the head shows expansion of the recent right pontine stroke\", lisinopril stopped due to low BP's, is working with therapies, L arm severe hemiplegia, no new dizziness nor falls since returning.        CODE STATUS/ADVANCE DIRECTIVES DISCUSSION: No Order - DNR only  Patient's living condition: lives alone  ALLERGIES:   Allergies   Allergen Reactions    Seasonal " Allergies Other (See Comments)     Watery eyes      PAST MEDICAL HISTORY: No past medical history on file.   PAST SURGICAL HISTORY:  has a past surgical history that includes REMV CATARACT EXTRACAP,INSERT LENS, W/O ECP (5/22/2003) and Laser YAG capsulotomy (7/21/2011).  FAMILY HISTORY: family history is not on file.  SOCIAL HISTORY:      Post Discharge Medication Reconciliation Status:  MED REC REQUIRED  Post Medication Reconciliation Status: medication reconcilation previously completed during another office visit    Current Outpatient Medications   Medication Sig    acetaminophen (TYLENOL) 500 MG tablet Take 1,000 mg by mouth 3 times daily as needed for pain    albuterol (PROAIR HFA/PROVENTIL HFA/VENTOLIN HFA) 108 (90 Base) MCG/ACT inhaler Inhale 1-2 puffs into the lungs every 4 hours as needed for shortness of breath, wheezing or cough    amLODIPine (NORVASC) 10 MG tablet Take 10 mg by mouth daily    aspirin (ASA) 81 MG chewable tablet Take 81 mg by mouth daily    atorvastatin (LIPITOR) 20 MG tablet Take 40 mg by mouth daily    Calcium Carb-Cholecalciferol 500-5 MG-MCG TABS Take 1 tablet by mouth daily    melatonin 3 MG tablet Take 3 mg by mouth nightly as needed for sleep    metoprolol tartrate (LOPRESSOR) 25 MG tablet Take 25 mg by mouth 2 times daily    Omega-3 Fatty Acids (FISH OIL) 1200 MG capsule Take 1,200 mg by mouth daily    rivaroxaban ANTICOAGULANT (XARELTO) 15 MG TABS tablet Take 15 mg by mouth daily    traZODone (DESYREL) 50 MG tablet Take 25 mg by mouth nightly as needed for sleep    Vitamin D3 (CHOLECALCIFEROL) 25 mcg (1000 units) tablet Take 1 tablet by mouth daily    lisinopril (ZESTRIL) 20 MG tablet Take 20 mg by mouth daily (Patient not taking: Reported on 1/29/2024)    Multiple Vitamin (MULTIVITAMIN OR) Take 1 tablet by mouth daily (Patient not taking: Reported on 1/29/2024)     No current facility-administered medications for this visit.     ROS:  4 point ROS including Respiratory, CV, GI and  ", other than that noted in the HPI,  is negative    Vitals:  BP (!) 165/70   Pulse 70   Temp 97.5  F (36.4  C)   Resp 15   Ht 1.753 m (5' 9\")   Wt 77.7 kg (171 lb 6.4 oz)   SpO2 96%   BMI 25.31 kg/m    Exam:  GENERAL APPEARANCE:  in no distress, appears healthy  ENT:  Mouth and posterior oropharynx normal, moist mucous membranes  RESP:  lungs clear to auscultation , no respiratory distress  CV:  regular rate and rhythm, no murmur, rub, or gallop, no edema  ABDOMEN:  bowel sounds normal  M/S:   Gait and station abnormal unsteady gait  SKIN:  Inspection of skin and subcutaneous tissue baseline  NEURO:   Examination of sensation by touch normal  PSYCH:  affect and mood normal    Lab/Diagnostic Data:  Labs done in SNF are in [place] EPIC. Please refer to them using [employer] Everywhere.    ASSESSMENT/PLAN:  (I63.9) Cerebrovascular accident (CVA), unspecified mechanism (H)  (primary encounter diagnosis)  (G81.94) Left hemiplegia (H)  (I10) Hypertension, unspecified type  Comment: post CVA, L hemiplegia, SBP's 100-160 range, denies dizziness today  Plan:   -discontinue lisinopril and MVI  -staff to check VS daily  -continue metoprolol, amlodipine, ASA, xarelto, statin  -encourage fluids  -follow up neuro 6-8 weeks        Electronically signed by: BRAYDEN Storey CNP  "

## 2024-02-01 NOTE — LETTER
"    2/1/2024        RE: Johnson Acosta  [address]  [facility] [address]        [facility] GERIATRICS    Chief Complaint   Patient presents with     RECHECK     HPI:  Johnson Acosta is a 89 year old  (5/22/1929), who is being seen today for an episodic care visit at: [facility] ([facility]) [8]. Today's concern is:   1. Cerebrovascular accident (CVA), unspecified mechanism (H)    2. Left hemiplegia (H)    3. Hypertension, unspecified type      Patient seen for follow up, post CVA, moderate L hemiplegia, slurred speech most of time, unable to self transfer, did have recent fall with double vision, CT completed and thought is the CVA has slightly expanded since initial insult, semi-nonsensical at times, SBP's in the  eange, HR's 60-70, appears healthy, motivated to get home.    Allergies, and PMH/PSH reviewed in EPIC today.  REVIEW OF SYSTEMS:  4 point ROS including Respiratory, CV, GI and , other than that noted in the HPI,  is negative    Objective:   BP (!) 142/99   Pulse 70   Temp 98.2  F (36.8  C)   Resp 16   Ht 1.753 m (5' 9\")   Wt 78.6 kg (173 lb 3.2 oz)   SpO2 93%   BMI 25.58 kg/m    GENERAL APPEARANCE:  in no distress, appears healthy  ENT:  Mouth and posterior oropharynx normal, moist mucous membranes  RESP:  lungs clear to auscultation , no respiratory distress  CV:  regular rate and rhythm, no murmur, rub, or gallop, no edema  ABDOMEN:  bowel sounds normal  M/S:   Gait and station abnormal transfer assist  SKIN:  Inspection of skin and subcutaneous tissue baseline  NEURO:   Examination of sensation by touch normal  PSYCH:  affect and mood normal    Labs done in SNF are in [facility]. Please refer to them using EPIC/Care Everywhere.    Assessment/Plan:  (I63.9) Cerebrovascular accident (CVA), unspecified mechanism (H)  (primary encounter diagnosis)  (G81.94) Left hemiplegia (H)  Comment: CVA 1/20, moderate L hemiplegia  Plan: PT/OT working with  -CBC, BMP on " 2/5  -continue ASA, statin and xarelto  -follow up neuro PRN    (I10) Hypertension, unspecified type  Comment: SBP's 140, HR 70  Plan: continue metoprolol and amlodipine  -staff to check VS daily    MED REC REQUIRED  Post Medication Reconciliation Status: medication reconcilation previously completed during another office visit          Electronically signed by: BRAYDEN Storey CNP          Sincerely,        BRAYDEN Storey CNP

## 2024-02-01 NOTE — PROGRESS NOTES
"[facility] GERIATRICS    Chief Complaint   Patient presents with    RECHECK     HPI:  Johnson Acosta is a 89 year old  (5/22/1929), who is being seen today for an episodic care visit at: [facility] ([facility]) [8]. Today's concern is:   1. Cerebrovascular accident (CVA), unspecified mechanism (H)    2. Left hemiplegia (H)    3. Hypertension, unspecified type      Patient seen for follow up, post CVA, moderate L hemiplegia, slurred speech most of time, unable to self transfer, did have recent fall with double vision, CT completed and thought is the CVA has slightly expanded since initial insult, semi-nonsensical at times, SBP's in the  eange, HR's 60-70, appears healthy, motivated to get home.    Allergies, and PMH/PSH reviewed in EPIC today.  REVIEW OF SYSTEMS:  4 point ROS including Respiratory, CV, GI and , other than that noted in the HPI,  is negative    Objective:   BP (!) 142/99   Pulse 70   Temp 98.2  F (36.8  C)   Resp 16   Ht 1.753 m (5' 9\")   Wt 78.6 kg (173 lb 3.2 oz)   SpO2 93%   BMI 25.58 kg/m    GENERAL APPEARANCE:  in no distress, appears healthy  ENT:  Mouth and posterior oropharynx normal, moist mucous membranes  RESP:  lungs clear to auscultation , no respiratory distress  CV:  regular rate and rhythm, no murmur, rub, or gallop, no edema  ABDOMEN:  bowel sounds normal  M/S:   Gait and station abnormal transfer assist  SKIN:  Inspection of skin and subcutaneous tissue baseline  NEURO:   Examination of sensation by touch normal  PSYCH:  affect and mood normal    Labs done in SNF are in [facility]. Please refer to them using [facility]/Care Everywhere.    Assessment/Plan:  (I63.9) Cerebrovascular accident (CVA), unspecified mechanism (H)  (primary encounter diagnosis)  (G81.94) Left hemiplegia (H)  Comment: CVA 1/20, moderate L hemiplegia  Plan: PT/OT working with  -CBC, BMP on 2/5  -continue ASA, statin and xarelto  -follow up neuro PRN    (I10) Hypertension, " unspecified type  Comment: SBP's 140, HR 70  Plan: continue metoprolol and amlodipine  -staff to check VS daily    MED REC REQUIRED  Post Medication Reconciliation Status: medication reconcilation previously completed during another office visit          Electronically signed by: BRAYDEN Storey CNP

## 2024-02-05 NOTE — PROGRESS NOTES
[facility] GERIATRICS    Chief Complaint   Patient presents with    RECHECK     HPI:  Johnson Acosta is a 89 year old  (5/22/1929), who is being seen today for an episodic care visit at: [facility] ([facility]) [8]. Today's concern is:   1. Cerebrovascular accident (CVA), unspecified mechanism (H)    2. Left hemiplegia (H)    3. Leukocytosis, unspecified type      Patient seen for follow up, post CVA, slurred speech, intake appropriate, dyphagia diet no longer indicated, severe L hemiplegia not improving with therapies, WBC count was 14.3 now down to 13.9, afebrile, no s/s infection, overall appears healthy.    Allergies, and PMH/PSH reviewed in EPIC today.  REVIEW OF SYSTEMS:  4 point ROS including Respiratory, CV, GI and , other than that noted in the HPI,  is negative    Objective:   /69   Pulse 72   Temp 98  F (36.7  C)   Resp 16   Wt 78.6 kg (173 lb 3.2 oz)   SpO2 95%   BMI 25.58 kg/m    GENERAL APPEARANCE:  in no distress, appears healthy  ENT:  Mouth and posterior oropharynx normal, moist mucous membranes  RESP:  lungs clear to auscultation , no respiratory distress  CV:  regular rate and rhythm, no murmur, rub, or gallop, no edema  ABDOMEN:  bowel sounds normal  M/S:   Gait and station abnormal transfer assist  SKIN:  Inspection of skin and subcutaneous tissue baseline  NEURO:   Examination of sensation by touch normal  PSYCH:  affect and mood normal    Labs done in SNF are in [facility]. Please refer to them using [facility]/Care Everywhere.    Assessment/Plan:  (I63.9) Cerebrovascular accident (CVA), unspecified mechanism (H)  (primary encounter diagnosis)  (G81.94) Left hemiplegia (H)  Comment: CVA 1/20, slurred speech, sever hemiplegia  Plan: PT/OT working with  -continue ASA, statin and xarelto  -follow up neurology 6-8 weeks  -change diet to regular, no choking/coughing    (D72.829) Leukocytosis, unspecified type  Comment: WBC was 14.3 now 13.9, afebrile  Plan: consider  mild elevation patient norm  -repeat CBC in 2-3 weeks    MED REC REQUIRED  Post Medication Reconciliation Status: medication reconcilation previously completed during another office visit          Electronically signed by: BRAYDEN Storey CNP

## 2024-02-08 NOTE — LETTER
2/8/2024        RE: Johnson Acosta  [address]  [facility] [address]        [facility] GERIATRICS    Chief Complaint   Patient presents with     RECHECK     HPI:  Johnson Acosta is a 89 year old  (5/22/1929), who is being seen today for an episodic care visit at: [facility] ([facility]) [8]. Today's concern is:   1. Cerebrovascular accident (CVA), unspecified mechanism (H)    2. Leukocytosis, unspecified type    3. Hypertension, unspecified type      Patient seen for follow up, also called daughter Yris to update on status, post CVA with L hemiplegia, his 3rd CVA, the previous 2 had no ongoing issues, unable to use LUE at all, mild improvement in LLE, also mildly elevated WBC at 14.3 then down to 13.9, afebrile, also SBP range 109-165 with goal range 100-140, overall appears healthy, oriented.    Allergies, and PMH/PSH reviewed in EPIC today.  REVIEW OF SYSTEMS:  4 point ROS including Respiratory, CV, GI and , other than that noted in the HPI,  is negative    Objective:   BP (!) 154/72   Pulse 61   Temp 97.7  F (36.5  C)   Resp 18   Wt 78.6 kg (173 lb 3.2 oz)   SpO2 96%   BMI 25.58 kg/m    GENERAL APPEARANCE:  in no distress, appears healthy  ENT:  Mouth and posterior oropharynx normal, moist mucous membranes  RESP:  lungs clear to auscultation , no respiratory distress  CV:  regular rate and rhythm, no murmur, rub, or gallop, no edema  ABDOMEN:  bowel sounds normal  M/S:   Gait and station abnormal transfer assist  SKIN:  Inspection of skin and subcutaneous tissue baseline  NEURO:   Examination of sensation by touch normal  PSYCH:  affect and mood normal    Labs done in SNF are in [facility]. Please refer to them using [employer]/Care Everywhere.    Assessment/Plan:  (I63.9) Cerebrovascular accident (CVA), unspecified mechanism (H)  (primary encounter diagnosis)  Comment: post CVA with severe L hemiplegia  Plan: therapies working with  -continue statin and xarelto  -follow  up neuro PRN    (D72.829) Leukocytosis, unspecified type  Comment: WBC's trending down, recent 13.9, afebrile  Plan: monitor temps daily  -repeat CBC as indicated    (I10) Hypertension, unspecified type  Comment: SBP range 109-165, goal per hospital 100-140  Plan:   -start lisinopril 10mg  -continue norvasc and metoprolol    MED REC REQUIRED  Post Medication Reconciliation Status: medication reconcilation previously completed during another office visit      Electronically signed by: BRAYDEN Storey CNP          Sincerely,        BRAYDEN Storey CNP

## 2024-02-08 NOTE — PROGRESS NOTES
[facility] GERIATRICS    Chief Complaint   Patient presents with    RECHECK     HPI:  Johnson Acosta is a 89 year old  (5/22/1929), who is being seen today for an episodic care visit at: [facility] ([facility]) [8]. Today's concern is:   1. Cerebrovascular accident (CVA), unspecified mechanism (H)    2. Leukocytosis, unspecified type    3. Hypertension, unspecified type      Patient seen for follow up, also called daughter Yris to update on status, post CVA with L hemiplegia, his 3rd CVA, the previous 2 had no ongoing issues, unable to use LUE at all, mild improvement in LLE, also mildly elevated WBC at 14.3 then down to 13.9, afebrile, also SBP range 109-165 with goal range 100-140, overall appears healthy, oriented.    Allergies, and PMH/PSH reviewed in EPIC today.  REVIEW OF SYSTEMS:  4 point ROS including Respiratory, CV, GI and , other than that noted in the HPI,  is negative    Objective:   BP (!) 154/72   Pulse 61   Temp 97.7  F (36.5  C)   Resp 18   Wt 78.6 kg (173 lb 3.2 oz)   SpO2 96%   BMI 25.58 kg/m    GENERAL APPEARANCE:  in no distress, appears healthy  ENT:  Mouth and posterior oropharynx normal, moist mucous membranes  RESP:  lungs clear to auscultation , no respiratory distress  CV:  regular rate and rhythm, no murmur, rub, or gallop, no edema  ABDOMEN:  bowel sounds normal  M/S:   Gait and station abnormal transfer assist  SKIN:  Inspection of skin and subcutaneous tissue baseline  NEURO:   Examination of sensation by touch normal  PSYCH:  affect and mood normal    Labs done in SNF are in [facility]. Please refer to them using [employer]/Care Everywhere.    Assessment/Plan:  (I63.9) Cerebrovascular accident (CVA), unspecified mechanism (H)  (primary encounter diagnosis)  Comment: post CVA with severe L hemiplegia  Plan: therapies working with  -continue statin and xarelto  -follow up neuro PRN    (D72.829) Leukocytosis, unspecified type  Comment: WBC's trending down,  recent 13.9, afebrile  Plan: monitor temps daily  -repeat CBC as indicated    (I10) Hypertension, unspecified type  Comment: SBP range 109-165, goal per hospital 100-140  Plan:   -start lisinopril 10mg  -continue norvasc and metoprolol    MED REC REQUIRED  Post Medication Reconciliation Status: medication reconcilation previously completed during another office visit      Electronically signed by: BRAYDEN Storey CNP

## 2024-02-12 PROBLEM — I69.30 HISTORY OF CVA WITH RESIDUAL DEFICIT: Status: ACTIVE | Noted: 2024-01-01

## 2024-02-12 PROBLEM — K21.9 GASTROESOPHAGEAL REFLUX DISEASE: Status: RESOLVED | Noted: 2020-08-12 | Resolved: 2024-01-01

## 2024-02-12 PROBLEM — G45.9 TIA (TRANSIENT ISCHEMIC ATTACK): Status: RESOLVED | Noted: 2020-08-17 | Resolved: 2024-01-01

## 2024-02-12 PROBLEM — I10 HYPERTENSION, UNSPECIFIED TYPE: Status: RESOLVED | Noted: 2024-01-01 | Resolved: 2024-01-01

## 2024-02-12 PROBLEM — R13.10 DYSPHAGIA, UNSPECIFIED TYPE: Status: RESOLVED | Noted: 2024-01-01 | Resolved: 2024-01-01

## 2024-02-12 NOTE — ASSESSMENT & PLAN NOTE
Secondary to right pontine stroke.  Blood pressure is controlled.  Patient continues on Xarelto and aspirin.  He will follow with neurology in March.

## 2024-02-12 NOTE — PROGRESS NOTES
HISTORY OF PRESENT ILLNESS:  Johnson is a 89 year old male, (5/22/1929), is being seen today at [facility] following hospitalization at [facility] from 1/26/2024 through 1/28/2024.  Patient present with left-sided weakness and change in vision and MRI revealed expansion of right pontine stroke.  Patient states this is his third stroke in his life.  He has some movement of his left fingers and improve movement of his left lower extremity.  He denies difficulties with swallowing.  He denies any vision changes.  He denies chest pain, shortness of breath or headache.  He feels physical therapy has been helpful.      Past Medical History/  Patient Active Problem List    Diagnosis Date Noted    Leukocytosis, unspecified type 01/25/2024     Priority: Medium    Generalized muscle weakness 01/25/2024     Priority: Medium    Left hemiplegia (H) 01/25/2024     Priority: Medium    History of CVA with residual deficit 01/25/2024     Priority: Medium    Paroxysmal atrial fibrillation (H) 08/17/2020     Priority: Medium    Sensorineural hearing loss, bilateral 08/12/2020     Priority: Medium    Basal cell carcinoma of ear 11/28/2011     Priority: Medium    Esophageal hiatus hernia 03/28/2011     Priority: Medium    Elevated PSA 03/22/2010     Priority: Medium     Prostate bx neg      Rosacea 03/10/2009     Priority: Medium    Anxiety state 07/05/2005     Priority: Medium    Essential hypertension 07/05/2005     Priority: Medium    Hyperlipidemia 07/05/2005     Priority: Medium       Past Surgical History:   Procedure Laterality Date    HC REMV CATARACT EXTRACAP,INSERT LENS, W/O ECP  5/22/2003    left eye    LASER YAG CAPSULOTOMY  7/21/2011    Procedure:LASER YAG CAPSULOTOMY; Surgeon:PAUL RODRÍGUEZ; Location:[facility] OR       History reviewed. No pertinent family history.     Social History     Tobacco Use    Smoking status: Not on file    Smokeless tobacco: Not on file   Substance Use Topics    Alcohol use: Not on  "file        Current Outpatient Medications   Medication Sig    acetaminophen (TYLENOL) 500 MG tablet Take 1,000 mg by mouth 3 times daily as needed for pain    albuterol (PROAIR HFA/PROVENTIL HFA/VENTOLIN HFA) 108 (90 Base) MCG/ACT inhaler Inhale 1-2 puffs into the lungs every 4 hours as needed for shortness of breath, wheezing or cough    amLODIPine (NORVASC) 10 MG tablet Take 10 mg by mouth daily    aspirin (ASA) 81 MG chewable tablet Take 81 mg by mouth daily    atorvastatin (LIPITOR) 20 MG tablet Take 40 mg by mouth daily    Calcium Carb-Cholecalciferol 500-5 MG-MCG TABS Take 1 tablet by mouth daily    lisinopril (ZESTRIL) 10 MG tablet Take 1 tablet (10 mg) by mouth daily    melatonin 3 MG tablet Take 3 mg by mouth nightly as needed for sleep    metoprolol tartrate (LOPRESSOR) 25 MG tablet Take 25 mg by mouth 2 times daily    Omega-3 Fatty Acids (FISH OIL) 1200 MG capsule Take 1,200 mg by mouth daily    rivaroxaban ANTICOAGULANT (XARELTO) 15 MG TABS tablet Take 15 mg by mouth daily    traZODone (DESYREL) 50 MG tablet Take 25 mg by mouth nightly as needed for sleep    Vitamin D3 (CHOLECALCIFEROL) 25 mcg (1000 units) tablet Take 1 tablet by mouth daily     No current facility-administered medications for this visit.       Allergies   Allergen Reactions    Seasonal Allergies Other (See Comments)     Watery eyes       Information reviewed:  Medications, vital signs, orders, and nursing notes.    Review of Systems   Constitutional:  Negative for appetite change, chills and fever.   Eyes:  Negative for visual disturbance.   Respiratory:  Negative for cough and shortness of breath.    Cardiovascular:  Negative for chest pain.   Gastrointestinal:  Negative for abdominal pain, constipation, diarrhea and nausea.   Musculoskeletal:  Negative for arthralgias.   Neurological:  Positive for weakness (Left side).        OBJECTIVE:  /59   Pulse 66   Temp 97.9  F (36.6  C)   Resp 17   Ht 1.753 m (5' 9\")   Wt 78.6 kg " (173 lb 3.2 oz)   SpO2 96%   BMI 25.58 kg/m    Physical Exam  Constitutional:       General: He is not in acute distress.  Neck:      Thyroid: No thyromegaly.   Cardiovascular:      Rate and Rhythm: Normal rate and regular rhythm.      Heart sounds: No murmur heard.  Pulmonary:      Effort: Pulmonary effort is normal. No respiratory distress.      Breath sounds: Normal breath sounds. No wheezing or rales.   Abdominal:      General: Bowel sounds are normal. There is no distension.      Palpations: Abdomen is soft.      Tenderness: There is no abdominal tenderness.   Musculoskeletal:      Cervical back: Normal range of motion.   Skin:     General: Skin is warm and dry.   Neurological:      Mental Status: He is alert and oriented to person, place, and time.      Sensory: No sensory deficit.      Comments: Patient has slight movement of his left fingers.  Unable to lift his left arm.  He is able to lift and extend and flex his left leg.   Psychiatric:         Mood and Affect: Mood normal.         Behavior: Behavior normal.          ASSESSMENT/PLAN:    MED REC REQUIRED  Post Medication Reconciliation Status:  Discharge medications reconciled, continue medications without change      Left hemiplegia (H)  Secondary to right pontine stroke.  Blood pressure is controlled.  Patient continues on Xarelto and aspirin.  He will follow with neurology in March.    Paroxysmal atrial fibrillation (H)  Rate controlled with metoprolol.  Anticoagulation with Xarelto.    Essential hypertension  Blood pressure controlled with amlodipine and metoprolol.    Hyperlipidemia  Continue atorvastatin at 40 mg daily    Leukocytosis, unspecified type  No obvious infection.  Continues to trend down.  Will follow CBC as needed.          Danielle Randall MD

## 2024-02-14 NOTE — TELEPHONE ENCOUNTER
[facility] Triage Nurse Telephone Encounter    Provider: BRAYDEN Nova CNP   Facility: [facility]  Facility Type:  TCU    Caller: Danette   Call Back Number: [phone]    Allergies:    Allergies   Allergen Reactions    Seasonal Allergies Other (See Comments)     Watery eyes        Reason for call: This am the patient /60 p 55. The patient has an order for Metoprolol 25mg bid. Currently no hold parameters on the metoprolol. The patient did have 2 episode of Epistaxis last evening.     Verbal Order/Direction given by Provider: Hold Metoprolol for SBP <110 or AP <60.     Provider giving Order:  BRAYDEN Nova CNP     Verbal Order given to: Danette Min RN

## 2024-02-14 NOTE — TELEPHONE ENCOUNTER
ON-CALL  GERIATRICS CROSS-COVERAGE NOTE:    Call from RN to report that patient has had 2 episodes of epistaxis since 5:40 PM, the first one more severe. Both stopped spontaneously.  He is hemodynamically stable.  Patient is on aspirin and Xarelto. He has a history of atrial fibrillation but no known coronary artery disease per chart review.  According to RN, he has no previous history of epistaxis.    Plan:  Stop aspirin  Continue Xarelto for now but cautiously  RN to pinch the nose if epistaxis recurs and notify the medical team  Further evaluation by primary team      Nae Preciado MD

## 2024-02-15 PROBLEM — R04.0 EPISTAXIS: Status: ACTIVE | Noted: 2024-01-01

## 2024-02-15 NOTE — PROGRESS NOTES
[facility] GERIATRICS    Chief Complaint   Patient presents with    RECHECK     HPI:  Johnson Acosta is a 89 year old  (5/22/1929), who is being seen today for an episodic care visit at: [facility] ([facility]) [8]. Today's concern is:   1. Cerebrovascular accident (CVA), unspecified mechanism (H)    2. Left hemiplegia (H)    3. Epistaxis    4. Essential hypertension      Patient seen for follow up, post CVA 1/20, severe L hemiplegia, no use L arm, 20% L leg, unable to stand/transfer without assist, first 2 CVA's no residual affects, somewhat depressed as unable to return home alone, nose bleed 2/13 with resolution thought to be from AC's, also HR's soft at times, recently 55, on metoprolol, overall appears healthy, oriented.    Allergies, and PMH/PSH reviewed in EPIC today.  REVIEW OF SYSTEMS:  4 point ROS including Respiratory, CV, GI and , other than that noted in the HPI,  is negative    Objective:   BP (!) 150/75   Pulse 78   Temp 97.6  F (36.4  C)   Resp 22   Wt 78.6 kg (173 lb 3.2 oz)   SpO2 97%   BMI 25.58 kg/m    GENERAL APPEARANCE:  in no distress, appears healthy  ENT:  Mouth and posterior oropharynx normal, moist mucous membranes  RESP:  lungs clear to auscultation , no respiratory distress  CV:  regular rate and rhythm, no murmur, rub, or gallop, no edema  ABDOMEN:  bowel sounds normal  M/S:   Gait and station abnormal transfer assist  SKIN:  Inspection of skin and subcutaneous tissue baseline  NEURO:   Examination of sensation by touch normal  PSYCH:  affect and mood normal    Labs done in SNF are in [facility]. Please refer to them using [employer]/Care Everywhere.    Assessment/Plan:  (I63.9) Cerebrovascular accident (CVA), unspecified mechanism (H)  (primary encounter diagnosis)  (G81.94) Left hemiplegia (H)  Comment: severe L hemiplegia, previously independent  Plan: PT/OT working with  -may discharge to LTC soon due to care needs  -discontinue ASA  -continue  xarelto    (R04.0) Epistaxis  Comment: bleed 1x on 2/13  Plan: discontinue ASA  -start afrin BID PRN    (I10) Essential hypertension  Comment: wide range SBP's and HR's  Plan: place parameters to hold metoprolol <110/<60  -continue lisinopril, amlodipine  -staff to check VS daily    MED REC REQUIRED  Post Medication Reconciliation Status: medication reconcilation previously completed during another office visit      Electronically signed by: BRAYDEN Storey CNP

## 2024-02-15 NOTE — LETTER
2/15/2024        RE: Johnson Acosta  [address]  [facility] [address]        [facility] GERIATRICS    Chief Complaint   Patient presents with     RECHECK     HPI:  Johnson Acosta is a 89 year old  (5/22/1929), who is being seen today for an episodic care visit at: [facility] ([facility]) [8]. Today's concern is:   1. Cerebrovascular accident (CVA), unspecified mechanism (H)    2. Left hemiplegia (H)    3. Epistaxis    4. Essential hypertension      Patient seen for follow up, post CVA 1/20, severe L hemiplegia, no use L arm, 20% L leg, unable to stand/transfer without assist, first 2 CVA's no residual affects, somewhat depressed as unable to return home alone, nose bleed 2/13 with resolution thought to be from AC's, also HR's soft at times, recently 55, on metoprolol, overall appears healthy, oriented.    Allergies, and PMH/PSH reviewed in EPIC today.  REVIEW OF SYSTEMS:  4 point ROS including Respiratory, CV, GI and , other than that noted in the HPI,  is negative    Objective:   BP (!) 150/75   Pulse 78   Temp 97.6  F (36.4  C)   Resp 22   Wt 78.6 kg (173 lb 3.2 oz)   SpO2 97%   BMI 25.58 kg/m    GENERAL APPEARANCE:  in no distress, appears healthy  ENT:  Mouth and posterior oropharynx normal, moist mucous membranes  RESP:  lungs clear to auscultation , no respiratory distress  CV:  regular rate and rhythm, no murmur, rub, or gallop, no edema  ABDOMEN:  bowel sounds normal  M/S:   Gait and station abnormal transfer assist  SKIN:  Inspection of skin and subcutaneous tissue baseline  NEURO:   Examination of sensation by touch normal  PSYCH:  affect and mood normal    Labs done in SNF are in [facility]. Please refer to them using [employer]/Care Everywhere.    Assessment/Plan:  (I63.9) Cerebrovascular accident (CVA), unspecified mechanism (H)  (primary encounter diagnosis)  (G81.94) Left hemiplegia (H)  Comment: severe L hemiplegia, previously independent  Plan: PT/OT working  with  -may discharge to LTC soon due to care needs  -discontinue ASA  -continue xarelto    (R04.0) Epistaxis  Comment: bleed 1x on 2/13  Plan: discontinue ASA  -start afrin BID PRN    (I10) Essential hypertension  Comment: wide range SBP's and HR's  Plan: place parameters to hold metoprolol <110/<60  -continue lisinopril, amlodipine  -staff to check VS daily    MED REC REQUIRED  Post Medication Reconciliation Status: medication reconcilation previously completed during another office visit      Electronically signed by: BRAYDEN Storey CNP          Sincerely,        BRAYDEN Storey CNP

## 2024-02-20 NOTE — LETTER
2/20/2024        RE: Johnson Acosta  [address]  [facility] [address]        [facility] GERIATRICS    Chief Complaint   Patient presents with     RECHECK     HPI:  Johnson Acosta is a 89 year old  (5/22/1929), who is being seen today for an episodic care visit at: [facility] ([facility]) [8].     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    Today's concern is:   Pending LTC    Allergies, and PMH/PSH reviewed in EPIC today.  REVIEW OF SYSTEMS:  4 point ROS including Respiratory, CV, GI and , other than that noted in the HPI,  is negative    Objective:   BP (!) 147/62   Pulse 56   Temp 97.8  F (36.6  C)   Resp 18   Wt 78.6 kg (173 lb 3.2 oz)   SpO2 90%   BMI 25.58 kg/m    GENERAL APPEARANCE:  Alert, in no distress, cooperative, denies pain, severe left-sided weakness  RESP:  no respiratory distress, diminished breath sounds bilateral, room air  CV:  no edema, irregular rhythm per rate controlled A-fib  PSYCH:  insight and judgement impaired    Most Recent 3 CBC's:  Recent Labs   Lab Test 02/05/24  0607 01/29/24  0530   WBC 13.9* 14.3*   HGB 14.5 14.8   MCV 96 95    222     Most Recent 3 BMP's:  Recent Labs   Lab Test 02/05/24  0607 01/29/24  0530    136   POTASSIUM 4.7 4.4   CHLORIDE 101 103   CO2 25 22   BUN 22.3 19.1   CR 1.07 0.98   ANIONGAP 10 11   NILA 9.6 9.9*   GLC 80 74       Assessment/Plan:    (G81.94) Left hemiplegia (H)    Right pontine CVA  Severe left sided hemiplegia, continue statin    (I10) Essential hypertension  Continue amlodipine 10 mg daily, lisinopril 10 mg daily and metoprolol tartrate 25 mg twice daily, -130s, HR <80s    (I48.0) Paroxysmal atrial fibrillation (H)  Continue rivaroxaban 15 mg daily    (R04.0) Epistaxis  ASA discontinued prior, monitor    Pain  Continue  Tylenol 1000 mg 3 times daily as needed, denies pain    Renal function  Last CR 1.07 with GFR >60 on 2/5    Anemia surveillance  Last Hgb 14.5 on 2/5    Insomnia  Continue trazodone 25 mg at bedtime as needed    Orders:  NA    Electronically signed by: Rogerio Nelson NP         Sincerely,        Rogerio Nelson NP

## 2024-02-20 NOTE — PROGRESS NOTES
[facility] GERIATRICS    Chief Complaint   Patient presents with    RECHECK     HPI:  Johnson Acosta is a 89 year old  (5/22/1929), who is being seen today for an episodic care visit at: [facility] ([facility]) [8].     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    Today's concern is:   Pending LTC    Allergies, and PMH/PSH reviewed in [facility] today.  REVIEW OF SYSTEMS:  4 point ROS including Respiratory, CV, GI and , other than that noted in the HPI,  is negative    Objective:   BP (!) 147/62   Pulse 56   Temp 97.8  F (36.6  C)   Resp 18   Wt 78.6 kg (173 lb 3.2 oz)   SpO2 90%   BMI 25.58 kg/m    GENERAL APPEARANCE:  Alert, in no distress, cooperative, denies pain, severe left-sided weakness  RESP:  no respiratory distress, diminished breath sounds bilateral, room air  CV:  no edema, irregular rhythm per rate controlled A-fib  PSYCH:  insight and judgement impaired    Most Recent 3 CBC's:  Recent Labs   Lab Test 02/05/24  0607 01/29/24  0530   WBC 13.9* 14.3*   HGB 14.5 14.8   MCV 96 95    222     Most Recent 3 BMP's:  Recent Labs   Lab Test 02/05/24  0607 01/29/24  0530    136   POTASSIUM 4.7 4.4   CHLORIDE 101 103   CO2 25 22   BUN 22.3 19.1   CR 1.07 0.98   ANIONGAP 10 11   NILA 9.6 9.9*   GLC 80 74       Assessment/Plan:    (G81.94) Left hemiplegia (H)    Right pontine CVA  Severe left sided hemiplegia, continue statin    (I10) Essential hypertension  Continue amlodipine 10 mg daily, lisinopril 10 mg daily and metoprolol tartrate 25 mg twice daily, -130s, HR <80s    (I48.0) Paroxysmal atrial fibrillation (H)  Continue rivaroxaban 15 mg daily    (R04.0) Epistaxis  ASA discontinued prior, monitor    Pain  Continue Tylenol 1000 mg 3 times daily as needed, denies pain    Renal function  Last CR 1.07 with GFR  >60 on 2/5    Anemia surveillance  Last Hgb 14.5 on 2/5    Insomnia  Continue trazodone 25 mg at bedtime as needed    Orders:  NA    Electronically signed by: Rogerio Nelson NP

## 2024-02-26 PROBLEM — G47.00 INSOMNIA, UNSPECIFIED TYPE: Status: ACTIVE | Noted: 2024-01-01

## 2024-02-26 NOTE — PROGRESS NOTES
[facility] GERIATRICS    Chief Complaint   Patient presents with    RECHECK     HPI:  Johnson Acosta is a 89 year old  (5/22/1929), who is being seen today for an episodic care visit at: [facility] () [[id number]]. Today's concern is:   1. Cerebrovascular accident (CVA), unspecified mechanism (H)    2. Left hemiplegia (H)    3. Insomnia, unspecified type      Patient seen for follow up, therapy also present, pleasant, oriented, post CVA on 1/20 with severe L hemiplegia that is slowly starting to improve, now staff reports having certain difficulty sleeping and requesting sleep med, overall appears healthy.    Allergies, and PMH/PSH reviewed in EPIC today.  REVIEW OF SYSTEMS:  4 point ROS including Respiratory, CV, GI and , other than that noted in the HPI,  is negative    Objective:   /61   Pulse 69   Temp 97.7  F (36.5  C)   Resp 14   Wt 81.8 kg (180 lb 6.4 oz)   SpO2 91%   BMI 26.64 kg/m    GENERAL APPEARANCE:  in no distress, appears healthy  ENT:  Mouth and posterior oropharynx normal, moist mucous membranes  RESP:  lungs clear to auscultation , no respiratory distress  CV:  regular rate and rhythm, no murmur, rub, or gallop, no edema  ABDOMEN:  bowel sounds normal  M/S:   Gait and station abnormal transfer assist  SKIN:  Inspection of skin and subcutaneous tissue baseline  NEURO:   Examination of sensation by touch normal  PSYCH:  affect and mood normal    Labs done in SNF are in [facility]. Please refer to them using [facility]/Care Everywhere.    Assessment/Plan:  (I63.9) Cerebrovascular accident (CVA), unspecified mechanism (H)  (primary encounter diagnosis)  (G81.94) Left hemiplegia (H)  Comment: post CVA, improving slowly  Plan: therapy to keep working with  -transfer to LTC, to stay at [facility] unless improves to point of being safe for [facility]  -continue ASA, statin, xarelto, metoprolol  -monitor BP's  -follow up neuro PRN    (G47.00) Insomnia, unspecified type  Comment:  reports not sleeping well  Plan: continue melatonin as PRN  -ordered a sleep study in-house to monitor habits  -plan to scheduled melatonin and/or change to trazodone     MED REC REQUIRED  Post Medication Reconciliation Status: medication reconcilation previously completed during another office visit        Electronically signed by: BRAYDEN Storey CNP

## 2024-02-26 NOTE — LETTER
2/26/2024        RE: Johnson Acosta  [address]  [facility] [address]        [facility] GERIATRICS    Chief Complaint   Patient presents with     RECHECK     HPI:  Johnson Acosta is a 89 year old  (5/22/1929), who is being seen today for an episodic care visit at: [facility] () [[id number]]. Today's concern is:   1. Cerebrovascular accident (CVA), unspecified mechanism (H)    2. Left hemiplegia (H)    3. Insomnia, unspecified type      Patient seen for follow up, therapy also present, pleasant, oriented, post CVA on 1/20 with severe L hemiplegia that is slowly starting to improve, now staff reports having certain difficulty sleeping and requesting sleep med, overall appears healthy.    Allergies, and PMH/PSH reviewed in EPIC today.  REVIEW OF SYSTEMS:  4 point ROS including Respiratory, CV, GI and , other than that noted in the HPI,  is negative    Objective:   /61   Pulse 69   Temp 97.7  F (36.5  C)   Resp 14   Wt 81.8 kg (180 lb 6.4 oz)   SpO2 91%   BMI 26.64 kg/m    GENERAL APPEARANCE:  in no distress, appears healthy  ENT:  Mouth and posterior oropharynx normal, moist mucous membranes  RESP:  lungs clear to auscultation , no respiratory distress  CV:  regular rate and rhythm, no murmur, rub, or gallop, no edema  ABDOMEN:  bowel sounds normal  M/S:   Gait and station abnormal transfer assist  SKIN:  Inspection of skin and subcutaneous tissue baseline  NEURO:   Examination of sensation by touch normal  PSYCH:  affect and mood normal    Labs done in SNF are in [facility]. Please refer to them using [employer]/Care Everywhere.    Assessment/Plan:  (I63.9) Cerebrovascular accident (CVA), unspecified mechanism (H)  (primary encounter diagnosis)  (G81.94) Left hemiplegia (H)  Comment: post CVA, improving slowly  Plan: therapy to keep working with  -transfer to LTC, to stay at [facility] unless improves to point of being safe for [facility]  -continue ASA, statin, xarelto,  metoprolol  -monitor BP's  -follow up neuro PRN    (G47.00) Insomnia, unspecified type  Comment: reports not sleeping well  Plan: continue melatonin as PRN  -ordered a sleep study in-house to monitor habits  -plan to scheduled melatonin and/or change to trazodone     MED REC REQUIRED  Post Medication Reconciliation Status: medication reconcilation previously completed during another office visit        Electronically signed by: BRAYDEN Storey CNP          Sincerely,        BRAYDEN Storey CNP

## 2024-03-04 PROBLEM — R52 GENERALIZED PAIN: Status: ACTIVE | Noted: 2024-01-01

## 2024-03-04 NOTE — PROGRESS NOTES
[facility] GERIATRICS    Chief Complaint   Patient presents with    RECHECK     HPI:  Johnson Acosta is a 89 year old  (5/22/1929), who is being seen today for an episodic care visit at: [facility] () [[id number]]. Today's concern is:   1. Paroxysmal atrial fibrillation (H)    2. Insomnia, unspecified type    3. Generalized pain      Patient seen for follow up, reports of having general pain and asking for APAP often, not sleeping well, per patient room mate is loud and is difficult to get to sleep at times, denies CP/palpitations, able to make needs known, overall appears healthy for age.    Allergies, and PMH/PSH reviewed in EPIC today.  REVIEW OF SYSTEMS:  4 point ROS including Respiratory, CV, GI and , other than that noted in the HPI,  is negative    Objective:   /59   Pulse 62   Temp 97.5  F (36.4  C)   Resp 18   Wt 82.1 kg (181 lb)   SpO2 97%   BMI 26.73 kg/m    GENERAL APPEARANCE:  in no distress, appears healthy  ENT:  Mouth and posterior oropharynx normal, moist mucous membranes  RESP:  lungs clear to auscultation , no respiratory distress  CV:  regular rate and rhythm, no murmur, rub, or gallop, no edema  ABDOMEN:  bowel sounds normal  M/S:   Gait and station abnormal transfer assist  SKIN:  Inspection of skin and subcutaneous tissue baseline  NEURO:   Examination of sensation by touch normal  PSYCH:  affect and mood normal    Labs done in SNF are in [facility]. Please refer to them using [facility]/Care Everywhere.    Assessment/Plan:  (I48.0) Paroxysmal atrial fibrillation (H)  (primary encounter diagnosis)  Comment: RRR, denies CP  Plan: continue xarelto and statin  -discontinue ASA81; having nosebleeds  -daily vitals    (G47.00) Insomnia, unspecified type  Comment: not sleeping well  Plan: discontinue melatonin  -change trazodone to 25mg at bedtime scheduled    (R52) Generalized pain  Comment: aches and pains, nothing specific  Plan: change APAP to TID scheduled  -if  having specific pain plan to start patch or topical versus systemic    MED REC REQUIRED  Post Medication Reconciliation Status: medication reconcilation previously completed during another office visit          Electronically signed by: BRAYDEN Storey CNP

## 2024-03-11 NOTE — LETTER
3/11/2024        RE: Johnson Acosta  [address]  [facility] [place] [facility] [address]        [facility] GERIATRICS    Chief Complaint   Patient presents with     RECHECK     HPI:  Johnson Acosta is a 89 year old  (5/22/1929), who is being seen today for an episodic care visit at: [facility] () [[id number]]. Today's concern is:   1. Cerebrovascular accident (CVA), unspecified mechanism (H)    2. Left hemiplegia (H)    3. Insomnia, unspecified type      Patient seen for follow up, CVA in January with L hemiplegia, cognitive intact, L leg 50% use, L arm very limited, therapies still working with, also sleeping better with trazodone on board, still hopes to return home.    Allergies, and PMH/PSH reviewed in EPIC today.  REVIEW OF SYSTEMS:  4 point ROS including Respiratory, CV, GI and , other than that noted in the HPI,  is negative    Objective:   /72   Pulse 81   Temp 97.5  F (36.4  C)   Resp 18   Wt 80 kg (176 lb 6.4 oz)   SpO2 95%   BMI 26.05 kg/m    GENERAL APPEARANCE:  in no distress, appears healthy  ENT:  Mouth and posterior oropharynx normal, moist mucous membranes  RESP:  lungs clear to auscultation , no respiratory distress  CV:  regular rate and rhythm, no murmur, rub, or gallop, no edema  ABDOMEN:  no guarding or rebound, bowel sounds normal  M/S:   Gait and station abnormal unsteady  SKIN:  Inspection of skin and subcutaneous tissue baseline  NEURO:   Examination of sensation by touch normal  PSYCH:  affect and mood normal    Labs done in SNF are in [facility]. Please refer to them using [employer]/Care Everywhere.    Assessment/Plan:  (I63.9) Cerebrovascular accident (CVA), unspecified mechanism (H)  (primary encounter diagnosis)  (G81.94) Left hemiplegia (H)  Comment: post CVA, L leg improving, L arm limited to no use  Plan: continue xarelto, statin and fish oil  -staff to check VS daily  -follow up neuro PRN    (G47.00) Insomnia, unspecified type  Comment: reports sleeping  better  Plan: continue trazodone 25mg  -sleep study and was sleeping well when checked on   -no change in plan today    MED REC REQUIRED  Post Medication Reconciliation Status: medication reconcilation previously completed during another office visit          Electronically signed by: BRAYDEN Storey CNP          Sincerely,        BRAYDEN Storey CNP

## 2024-03-11 NOTE — PROGRESS NOTES
[facility] GERIATRICS    Chief Complaint   Patient presents with    RECHECK     HPI:  Johnson Acosta is a 89 year old  (5/22/1929), who is being seen today for an episodic care visit at: [facility] () [[id number]]. Today's concern is:   1. Cerebrovascular accident (CVA), unspecified mechanism (H)    2. Left hemiplegia (H)    3. Insomnia, unspecified type      Patient seen for follow up, CVA in January with L hemiplegia, cognitive intact, L leg 50% use, L arm very limited, therapies still working with, also sleeping better with trazodone on board, still hopes to return home.    Allergies, and PMH/PSH reviewed in EPIC today.  REVIEW OF SYSTEMS:  4 point ROS including Respiratory, CV, GI and , other than that noted in the HPI,  is negative    Objective:   /72   Pulse 81   Temp 97.5  F (36.4  C)   Resp 18   Wt 80 kg (176 lb 6.4 oz)   SpO2 95%   BMI 26.05 kg/m    GENERAL APPEARANCE:  in no distress, appears healthy  ENT:  Mouth and posterior oropharynx normal, moist mucous membranes  RESP:  lungs clear to auscultation , no respiratory distress  CV:  regular rate and rhythm, no murmur, rub, or gallop, no edema  ABDOMEN:  no guarding or rebound, bowel sounds normal  M/S:   Gait and station abnormal unsteady  SKIN:  Inspection of skin and subcutaneous tissue baseline  NEURO:   Examination of sensation by touch normal  PSYCH:  affect and mood normal    Labs done in SNF are in [facility]. Please refer to them using [facility]/Care Everywhere.    Assessment/Plan:  (I63.9) Cerebrovascular accident (CVA), unspecified mechanism (H)  (primary encounter diagnosis)  (G81.94) Left hemiplegia (H)  Comment: post CVA, L leg improving, L arm limited to no use  Plan: continue xarelto, statin and fish oil  -staff to check VS daily  -follow up neuro PRN    (G47.00) Insomnia, unspecified type  Comment: reports sleeping better  Plan: continue trazodone 25mg  -sleep study and was sleeping well when checked on    -no change in plan today    MED REC REQUIRED  Post Medication Reconciliation Status: medication reconcilation previously completed during another office visit          Electronically signed by: BRAYDEN Storey CNP

## 2024-04-01 NOTE — LETTER
4/1/2024        RE: Johnson Acosta  [address]  [facility] [address]        [facility] GERIATRICS    Chief Complaint   Patient presents with     RECHECK     HPI:  Johnson Acosta is a 89 year old  (5/22/1929), who is being seen today for an episodic care visit at: [facility] () [[id number]]. Today's concern is:   1. Cerebrovascular accident (CVA), unspecified mechanism (H)    2. Left hemiplegia (H)    3. Confusion    4. Subacute cough      Patient seen for follow up, family recently visited and thought he may have pneumonia, exam today fairly benign, post CVA with L hemiplegia approx 50% arm and leg, unable to stand, today states wanting to get home and start driving a vehicle again, mild confusion, lungs fine basilar crackles, afebrile, appears healthy.    Allergies, and PMH/PSH reviewed in EPIC today.  REVIEW OF SYSTEMS:  4 point ROS including Respiratory, CV, GI and , other than that noted in the HPI,  is negative    Objective:   /66   Pulse 79   Temp 97.7  F (36.5  C)   Resp 18   Wt 77.1 kg (170 lb)   SpO2 99%   BMI 25.10 kg/m    GENERAL APPEARANCE:  in no distress, appears healthy  ENT:  Mouth and posterior oropharynx normal, moist mucous membranes  RESP:  lungs clear to auscultation , no respiratory distress  CV:  regular rate and rhythm, no murmur, rub, or gallop, no edema  ABDOMEN:  bowel sounds normal  M/S:   Gait and station abnormal unsteady  SKIN:  Inspection of skin and subcutaneous tissue baseline  NEURO:   Examination of sensation by touch normal  PSYCH:  affect and mood normal    Labs done in SNF are in [facility]. Please refer to them using [employer]/Care Everywhere.    Assessment/Plan:  (I63.9) Cerebrovascular accident (CVA), unspecified mechanism (H)  (primary encounter diagnosis)  (G81.94) Left hemiplegia (H)  (R41.0) Confusion  Comment: post CVA January, residual L weakness with confusion  Plan: staff to support as indicated  -no plan to discharge  nor drive at this juncture  -continue statin, metoprolol, xarelto  -follow up neuro PRN      (R05.2) Subacute cough  Comment: Hx of PNE, fine crackles  Plan: CXR ordered, results pending  -if cough increases consider mucinex BID x7 days    MED REC REQUIRED  Post Medication Reconciliation Status: medication reconcilation previously completed during another office visit        Electronically signed by: BRAYDEN Storey CNP          Sincerely,        BRAYDEN Storey CNP

## 2024-04-01 NOTE — PROGRESS NOTES
[facility] GERIATRICS    Chief Complaint   Patient presents with    RECHECK     HPI:  Johnson Acosta is a 89 year old  (5/22/1929), who is being seen today for an episodic care visit at: [facility] () [[id number]]. Today's concern is:   1. Cerebrovascular accident (CVA), unspecified mechanism (H)    2. Left hemiplegia (H)    3. Confusion    4. Subacute cough      Patient seen for follow up, family recently visited and thought he may have pneumonia, exam today fairly benign, post CVA with L hemiplegia approx 50% arm and leg, unable to stand, today states wanting to get home and start driving a vehicle again, mild confusion, lungs fine basilar crackles, afebrile, appears healthy.    Allergies, and PMH/PSH reviewed in EPIC today.  REVIEW OF SYSTEMS:  4 point ROS including Respiratory, CV, GI and , other than that noted in the HPI,  is negative    Objective:   /66   Pulse 79   Temp 97.7  F (36.5  C)   Resp 18   Wt 77.1 kg (170 lb)   SpO2 99%   BMI 25.10 kg/m    GENERAL APPEARANCE:  in no distress, appears healthy  ENT:  Mouth and posterior oropharynx normal, moist mucous membranes  RESP:  lungs clear to auscultation , no respiratory distress  CV:  regular rate and rhythm, no murmur, rub, or gallop, no edema  ABDOMEN:  bowel sounds normal  M/S:   Gait and station abnormal unsteady  SKIN:  Inspection of skin and subcutaneous tissue baseline  NEURO:   Examination of sensation by touch normal  PSYCH:  affect and mood normal    Labs done in SNF are in [facility]. Please refer to them using [facility]/Care Everywhere.    Assessment/Plan:  (I63.9) Cerebrovascular accident (CVA), unspecified mechanism (H)  (primary encounter diagnosis)  (G81.94) Left hemiplegia (H)  (R41.0) Confusion  Comment: post CVA January, residual L weakness with confusion  Plan: staff to support as indicated  -no plan to discharge nor drive at this juncture  -continue statin, metoprolol, xarelto  -follow up neuro  PRN      (R05.2) Subacute cough  Comment: Hx of PNE, fine crackles  Plan: CXR ordered, results pending  -if cough increases consider mucinex BID x7 days    MED REC REQUIRED  Post Medication Reconciliation Status: medication reconcilation previously completed during another office visit        Electronically signed by: BRAYDEN Storey CNP

## 2024-04-03 NOTE — PROGRESS NOTES
"[facility] GERIATRICS  [facility] Medical Record Number: [id number]  Chief Complaint   Patient presents with    RECHECK     HPI: Johnson Acosta is a 89 year old (5/22/1929), who is being seen today for an episodic care visit at: [facility] () [[id number]]. Today's concern is:   1. Cerebrovascular accident (CVA), unspecified mechanism (H)    2. Major depressive disorder in partial remission, unspecified whether recurrent (H24)    3. Subacute cough      Patient seen for follow up, therapy also present, post CVA in January, still making progress physically, very motivated to return home plus wants to drive again, mild s/s depression as is cognizant of independent lifestyle prior to CVA and understands difficulty of returning home, also having cough last week, CXR clean, today no SOB, sats >90%, lungs clear, overall appears healthy.    Allergies and PMH/PSH reviewed in EPIC today.    REVIEW OF SYSTEMS:  4 point ROS including Respiratory, CV, GI and , other than that noted in the HPI,  is negative    Objective:   /56   Pulse 70   Temp 97.3  F (36.3  C)   Resp 16   Ht 1.753 m (5' 9\")   Wt 79 kg (174 lb 3.2 oz)   SpO2 91%   BMI 25.72 kg/m    Exam:  GENERAL APPEARANCE:  in no distress, appears healthy  ENT:  Mouth and posterior oropharynx normal, moist mucous membranes  RESP:  lungs clear to auscultation , no respiratory distress  CV:  regular rate and rhythm, no murmur, rub, or gallop, no edema  ABDOMEN:  bowel sounds normal  M/S:   Gait and station abnormal transfer assist  SKIN:  Inspection of skin and subcutaneous tissue baseline  NEURO:   Examination of sensation by touch normal  PSYCH:  affect and mood normal    Labs:   Labs done in SNF are in [facility]. Please refer to them using [employer]/Care Everywhere.    Assessment/Plan:  (I63.9) Cerebrovascular accident (CVA), unspecified mechanism (H)  (primary encounter diagnosis)  Comment: post CVA, still moderate left hemiplegia  Plan: " PT/OT working with  -continue ASA, statin and xarelto  -follow up neurology PRN    (F32.4) Major depressive disorder in partial remission, unspecified whether recurrent (H24)  Comment: mild s/s depression, motivated to get home  Plan: consider ACP referral for T  -staff to support  -if depression exacerbates consider trial mirtazapine    (R05.2) Subacute cough  Comment: last week cough+, CXR clear  Plan: monitor lung sounds  -continue albuterol HFA PRN  -staff to check VS daily    MED REC REQUIRED  Post Medication Reconciliation Status: medication reconcilation previously completed during another office visit        Electronically signed by: BRAYDEN Storey CNP

## 2024-04-04 PROBLEM — F32.4 MAJOR DEPRESSIVE DISORDER IN PARTIAL REMISSION, UNSPECIFIED WHETHER RECURRENT (H): Status: ACTIVE | Noted: 2024-01-01

## 2024-04-04 NOTE — LETTER
4/4/2024        RE: Johnson Acosta  [address]  [facility] [address]        No notes on file      Sincerely,        Boogie Lazo, BRAYDEN CNP

## 2024-04-18 PROBLEM — L29.9 ITCHING: Status: ACTIVE | Noted: 2024-01-01

## 2024-04-18 NOTE — LETTER
4/18/2024        RE: Johnson Acosta  [address]  [facility] [address]        [facility] GERIATRICS    Chief Complaint   Patient presents with     RECHECK     HPI:  Johnson Acosta is a 89 year old  (5/22/1929), who is being seen today for an episodic care visit at: [facility] () [[id number]]. Today's concern is:   1. Cerebrovascular accident (CVA), unspecified mechanism (H)    2. Left hemiplegia (H)    3. Itching      Patient seen on request, post CVA with severe L hemiplegia, unable to transfer without assist, appears healthy, sleeping OK, cycles of increased confusion, earlier this month wanted to start driving again, today states feeling comfortable here and wants to stay, now having itchiness all over body, small scratches with tiny scabs on arms and abdomen where he can reach with R hand, unsure if contact derm or other insult as skin intact and normal tone.    Allergies, and PMH/PSH reviewed in EPIC today.  REVIEW OF SYSTEMS:  4 point ROS including Respiratory, CV, GI and , other than that noted in the HPI,  is negative    Objective:   /59   Pulse 59   Temp 97.3  F (36.3  C)   Resp 16   Wt 77.6 kg (171 lb)   SpO2 95%   BMI 25.25 kg/m    GENERAL APPEARANCE:  in no distress, appears healthy  ENT:  Mouth and posterior oropharynx normal, moist mucous membranes  RESP:  lungs clear to auscultation , no respiratory distress  CV:  regular rate and rhythm, no murmur, rub, or gallop, no edema  ABDOMEN:  bowel sounds normal  M/S:   Gait and station abnormal unsteady  SKIN:  Inspection of skin and subcutaneous tissue baseline  NEURO:   Examination of sensation by touch normal  PSYCH:  affect and mood normal    Labs done in SNF are in [facility]. Please refer to them using [employer]/Care Everywhere.    Assessment/Plan:  (I63.9) Cerebrovascular accident (CVA), unspecified mechanism (H)  (primary encounter diagnosis)  (G81.94) Left hemiplegia (H)  Comment: post CVA with moderate  residual L hemiplegia  Plan: staff to support as indicated  -plans to stay in LTC vs return home with family   -continue xarelto, statin  -follow up neuro PRN    (L29.9) Itching  Comment: states having itch since arrival, per staff only recent  Plan:   -start zyrtec 10mg daily  -apply patient lotion daily all skin surfaces  -plan to follow up on itchiness, consider trial hydralazine if not effective or switching lotions    MED REC REQUIRED  Post Medication Reconciliation Status: medication reconcilation previously completed during another office visit          Electronically signed by: BRAYDEN Storey CNP          Sincerely,        BRAYDEN Storey CNP

## 2024-04-18 NOTE — PROGRESS NOTES
[facility] GERIATRICS    Chief Complaint   Patient presents with    RECHECK     HPI:  Johnson Acosta is a 89 year old  (5/22/1929), who is being seen today for an episodic care visit at: [facility] () [[id number]]. Today's concern is:   1. Cerebrovascular accident (CVA), unspecified mechanism (H)    2. Left hemiplegia (H)    3. Itching      Patient seen on request, post CVA with severe L hemiplegia, unable to transfer without assist, appears healthy, sleeping OK, cycles of increased confusion, earlier this month wanted to start driving again, today states feeling comfortable here and wants to stay, now having itchiness all over body, small scratches with tiny scabs on arms and abdomen where he can reach with R hand, unsure if contact derm or other insult as skin intact and normal tone.    Allergies, and PMH/PSH reviewed in EPIC today.  REVIEW OF SYSTEMS:  4 point ROS including Respiratory, CV, GI and , other than that noted in the HPI,  is negative    Objective:   /59   Pulse 59   Temp 97.3  F (36.3  C)   Resp 16   Wt 77.6 kg (171 lb)   SpO2 95%   BMI 25.25 kg/m    GENERAL APPEARANCE:  in no distress, appears healthy  ENT:  Mouth and posterior oropharynx normal, moist mucous membranes  RESP:  lungs clear to auscultation , no respiratory distress  CV:  regular rate and rhythm, no murmur, rub, or gallop, no edema  ABDOMEN:  bowel sounds normal  M/S:   Gait and station abnormal unsteady  SKIN:  Inspection of skin and subcutaneous tissue baseline  NEURO:   Examination of sensation by touch normal  PSYCH:  affect and mood normal    Labs done in SNF are in [place] EPIC. Please refer to them using [employer]/Care Everywhere.    Assessment/Plan:  (I63.9) Cerebrovascular accident (CVA), unspecified mechanism (H)  (primary encounter diagnosis)  (G81.94) Left hemiplegia (H)  Comment: post CVA with moderate residual L hemiplegia  Plan: staff to support as indicated  -plans to stay in LTC vs return  home with family   -continue xarelto, statin  -follow up neuro PRN    (L29.9) Itching  Comment: states having itch since arrival, per staff only recent  Plan:   -start zyrtec 10mg daily  -apply patient lotion daily all skin surfaces  -plan to follow up on itchiness, consider trial hydralazine if not effective or switching lotions    MED REC REQUIRED  Post Medication Reconciliation Status: medication reconcilation previously completed during another office visit          Electronically signed by: BRAYDEN Storey CNP       Patient/Caregiver provided printed discharge information.

## 2024-04-22 PROBLEM — R04.0 EPISTAXIS: Status: RESOLVED | Noted: 2024-01-01 | Resolved: 2024-01-01

## 2024-04-22 NOTE — PROGRESS NOTES
HISTORY OF PRESENT ILLNESS:  Johnson is a 89 year old male, (5/22/1929), is being seen today at [facility] for routine visit.  He was seen in therapy.  He feels therapy has been helpful.      Past Medical History/  Patient Active Problem List    Diagnosis Date Noted    Itching 04/18/2024     Priority: Medium    Major depressive disorder in partial remission, unspecified whether recurrent (H24) 04/04/2024     Priority: Medium    Generalized pain 03/04/2024     Priority: Medium    Insomnia, unspecified type 02/26/2024     Priority: Medium    Leukocytosis, unspecified type 01/25/2024     Priority: Medium    Generalized muscle weakness 01/25/2024     Priority: Medium    Left hemiplegia (H) 01/25/2024     Priority: Medium    History of CVA with residual deficit 01/25/2024     Priority: Medium    Paroxysmal atrial fibrillation (H) 08/17/2020     Priority: Medium    Sensorineural hearing loss, bilateral 08/12/2020     Priority: Medium    Basal cell carcinoma of ear 11/28/2011     Priority: Medium    Esophageal hiatus hernia 03/28/2011     Priority: Medium    Elevated PSA 03/22/2010     Priority: Medium     Prostate bx neg      Rosacea 03/10/2009     Priority: Medium    Anxiety state 07/05/2005     Priority: Medium    Essential hypertension 07/05/2005     Priority: Medium    Hyperlipidemia 07/05/2005     Priority: Medium         Current Outpatient Medications   Medication Sig Dispense Refill    acetaminophen (TYLENOL) 500 MG tablet Take 1,000 mg by mouth 3 times daily      albuterol (PROAIR HFA/PROVENTIL HFA/VENTOLIN HFA) 108 (90 Base) MCG/ACT inhaler Inhale 1-2 puffs into the lungs every 4 hours as needed for shortness of breath, wheezing or cough      amLODIPine (NORVASC) 10 MG tablet Take 10 mg by mouth daily      atorvastatin (LIPITOR) 20 MG tablet Take 40 mg by mouth daily      Calcium Carb-Cholecalciferol 500-5 MG-MCG TABS Take 1 tablet by mouth daily      cetirizine (ZYRTEC) 10 MG tablet Take 1 tablet  "(10 mg) by mouth daily      guaiFENesin (ROBITUSSIN) 20 mg/mL liquid Take 200 mg by mouth every 4 hours as needed for cough      lisinopril (ZESTRIL) 10 MG tablet Take 1 tablet (10 mg) by mouth daily      metoprolol tartrate (LOPRESSOR) 25 MG tablet Take 25 mg by mouth 2 times daily      Omega-3 Fatty Acids (FISH OIL) 1200 MG capsule Take 1,200 mg by mouth daily      oxymetazoline (AFRIN) 0.05 % nasal spray Spray 0.2 mLs (2 sprays) into both nostrils 2 times daily as needed for congestion      polyethylene glycol (MIRALAX) 17 GM/Dose powder Take 1 Capful by mouth daily as needed for constipation      rivaroxaban ANTICOAGULANT (XARELTO) 15 MG TABS tablet Take 15 mg by mouth daily      traZODone (DESYREL) 50 MG tablet Take 25 mg by mouth at bedtime      Vitamin D3 (CHOLECALCIFEROL) 25 mcg (1000 units) tablet Take 1 tablet by mouth daily       No current facility-administered medications for this visit.       Review of Systems   Constitutional:  Negative for appetite change, chills and fever.   Respiratory:  Negative for cough and shortness of breath.    Cardiovascular:  Negative for chest pain.   Gastrointestinal:  Negative for abdominal pain, constipation, diarrhea and nausea.   Musculoskeletal:  Negative for arthralgias.   Neurological:  Positive for weakness (Left side).        OBJECTIVE:  /59   Pulse 59   Temp 97.3  F (36.3  C)   Resp 16   Ht 1.753 m (5' 9\")   Wt 77.6 kg (171 lb)   SpO2 95%   BMI 25.25 kg/m    Physical Exam  Constitutional:       General: He is not in acute distress.  Neck:      Thyroid: No thyromegaly.   Cardiovascular:      Rate and Rhythm: Normal rate and regular rhythm.      Heart sounds: No murmur heard.  Pulmonary:      Effort: Pulmonary effort is normal. No respiratory distress.      Breath sounds: Normal breath sounds. No wheezing or rales.   Abdominal:      General: Bowel sounds are normal. There is no distension.      Palpations: Abdomen is soft.      Tenderness: There is no " abdominal tenderness.   Skin:     General: Skin is warm and dry.   Neurological:      Mental Status: He is alert and oriented to person, place, and time.      Comments: Patient has slight movement of his left fingers.  Unable to lift his left arm.  He is able to lift and extend and flex his left leg.   Psychiatric:         Mood and Affect: Mood normal.         Behavior: Behavior normal.          ASSESSMENT/PLAN:    Left hemiplegia (H)  Secondary to stroke.  He continues with therapies and he finds benefit from these.    Paroxysmal atrial fibrillation (H)  Rate controlled with metoprolol.  Anticoagulation with Xarelto    Essential hypertension  Controlled with lisinopril, amlodipine and metoprolol    Insomnia, unspecified type  Controlled with trazodone            Danielle Randall MD

## 2024-04-25 NOTE — LETTER
4/25/2024        RE: Johnson Acosta  [address]  [facility] [address]        [facility] GERIATRICS    Chief Complaint   Patient presents with     RECHECK     HPI:  Johnson Acosta is a 89 year old  (5/22/1929), who is being seen today for an episodic care visit at: [facility] () [[id number]]. Today's concern is:   1. Cerebrovascular accident (CVA), unspecified mechanism (H)    2. Left hemiplegia (H)    3. Itching      Patient seen for follow up, post CVA and TCU stay with minimal improvement in L hemiplegia, wanted to return home but now resigned to stay in LTC, cognitive limitations, severe L hemiplegia and therapies still working with, newer pruritus that waxes and wanes, now has scratched abdomen and L arm with numerous small scabs on skin and normal tone no pink/red areas, unknown etiology, using same soap since arrival in January.    Allergies, and PMH/PSH reviewed in EPIC today.  REVIEW OF SYSTEMS:  4 point ROS including Respiratory, CV, GI and , other than that noted in the HPI,  is negative    Objective:   /63   Pulse 80   Temp 97.3  F (36.3  C)   Resp 16   Wt 78.6 kg (173 lb 3.2 oz)   SpO2 95%   BMI 25.58 kg/m    GENERAL APPEARANCE:  in no distress, appears healthy  ENT:  Mouth and posterior oropharynx normal, moist mucous membranes  RESP:  lungs clear to auscultation , no respiratory distress  CV:  regular rate and rhythm, no murmur, rub, or gallop, no edema  ABDOMEN:  bowel sounds normal  M/S:   Gait and station abnormal transfer assist  SKIN:  Inspection of skin and subcutaneous tissueas in HPI  NEURO:   Examination of sensation by touch normal  PSYCH:  affect and mood normal    Labs done in SNF are in [facility]. Please refer to them using [employer]/Care Everywhere.    Assessment/Plan:  (I63.9) Cerebrovascular accident (CVA), unspecified mechanism (H)  (primary encounter diagnosis)  (G81.94) Left hemiplegia (H)  Comment: CVA January, minimal improvement in  hemiplegia  Plan: plans to stay in LTC vs return home with support  -continue xarelto, statin  -PT/OT working with  -follow up neurology PRN    (L29.9) Itching  Comment: itching abdomen and L arm, where R working arm can reach  Plan:  -discontinue zyrtec  -start hydroxyzine 25mg TID scheduled  -continue lotion BID  -if not effective will increase hydroxyzine to 50mg TID    MED REC REQUIRED  Post Medication Reconciliation Status: medication reconcilation previously completed during another office visit        Electronically signed by: BRAYDEN Storey CNP          Sincerely,        BRAYDEN Storey CNP

## 2024-04-25 NOTE — PROGRESS NOTES
[facility] GERIATRICS    Chief Complaint   Patient presents with    RECHECK     HPI:  Johnson Acosta is a 89 year old  (5/22/1929), who is being seen today for an episodic care visit at: [facility] () [[id number]]. Today's concern is:   1. Cerebrovascular accident (CVA), unspecified mechanism (H)    2. Left hemiplegia (H)    3. Itching      Patient seen for follow up, post CVA and TCU stay with minimal improvement in L hemiplegia, wanted to return home but now resigned to stay in LTC, cognitive limitations, severe L hemiplegia and therapies still working with, newer pruritus that waxes and wanes, now has scratched abdomen and L arm with numerous small scabs on skin and normal tone no pink/red areas, unknown etiology, using same soap since arrival in January.    Allergies, and PMH/PSH reviewed in EPIC today.  REVIEW OF SYSTEMS:  4 point ROS including Respiratory, CV, GI and , other than that noted in the HPI,  is negative    Objective:   /63   Pulse 80   Temp 97.3  F (36.3  C)   Resp 16   Wt 78.6 kg (173 lb 3.2 oz)   SpO2 95%   BMI 25.58 kg/m    GENERAL APPEARANCE:  in no distress, appears healthy  ENT:  Mouth and posterior oropharynx normal, moist mucous membranes  RESP:  lungs clear to auscultation , no respiratory distress  CV:  regular rate and rhythm, no murmur, rub, or gallop, no edema  ABDOMEN:  bowel sounds normal  M/S:   Gait and station abnormal transfer assist  SKIN:  Inspection of skin and subcutaneous tissueas in HPI  NEURO:   Examination of sensation by touch normal  PSYCH:  affect and mood normal    Labs done in SNF are in [facility]. Please refer to them using EPIC/Care Everywhere.    Assessment/Plan:  (I63.9) Cerebrovascular accident (CVA), unspecified mechanism (H)  (primary encounter diagnosis)  (G81.94) Left hemiplegia (H)  Comment: CVA January, minimal improvement in hemiplegia  Plan: plans to stay in LTC vs return home with support  -continue xarelto,  statin  -PT/OT working with  -follow up neurology PRN    (L29.9) Itching  Comment: itching abdomen and L arm, where R working arm can reach  Plan:  -discontinue zyrtec  -start hydroxyzine 25mg TID scheduled  -continue lotion BID  -if not effective will increase hydroxyzine to 50mg TID    MED REC REQUIRED  Post Medication Reconciliation Status: medication reconcilation previously completed during another office visit        Electronically signed by: BRAYDEN Storey CNP

## 2024-04-29 NOTE — LETTER
4/29/2024        RE: Johnson Acosta  [address]  [facility] [address]        [facility] GERIATRICS    Chief Complaint   Patient presents with     RECHECK     HPI:  Johnson Acosta is a 89 year old  (5/22/1929), who is being seen today for an episodic care visit at: [facility] () [[id number]]. Today's concern is:   1. Cerebrovascular accident (CVA), unspecified mechanism (H)    2. Left hemiplegia (H)    3. Itching      Patient seen for follow up, post CVA (3rd) 1/20/24 with moderate to severe L side hemiplegia, unable to return home due to care needs, cognitive intact with intermittent periods of confusion, recent increase in itching, uses R hand to scratch the other arm, across abdomen and both legs, small scabs and scratch marks, trial zofran not effective, started hydroxyzine with limited efficacy, overall appears healthy.    Allergies, and PMH/PSH reviewed in EPIC today.  REVIEW OF SYSTEMS:  4 point ROS including Respiratory, CV, GI and , other than that noted in the HPI,  is negative    Objective:   /63   Pulse 80   Temp 97.3  F (36.3  C)   Resp 16   Wt 78.5 kg (173 lb)   SpO2 95%   BMI 25.55 kg/m    GENERAL APPEARANCE:  in no distress, appears healthy  ENT:  Mouth and posterior oropharynx normal, moist mucous membranes  RESP:  lungs clear to auscultation , no respiratory distress  CV:  regular rate and rhythm, no murmur, rub, or gallop, no edema  ABDOMEN:  no guarding or rebound  M/S:   Gait and station abnormal unsteady  SKIN:  Inspection of skin and subcutaneous tissue baseline  NEURO:   Examination of sensation by touch normal  PSYCH:  affect and mood normal    Labs done in SNF are in [facility]. Please refer to them using [facility]/Care Everywhere.    Assessment/Plan:  (I63.9) Cerebrovascular accident (CVA), unspecified mechanism (H)  (primary encounter diagnosis)  (G81.94) Left hemiplegia (H)  Comment: CVA 1/20, limited improvement in L hemiplegia  Plan:  continue PT/OT for now  -continue statin, xarelto  -follow up neurology PRN  -plans to stay at [facility] versus return home or live with family    (L29.9) Itching  Comment: torso, L arm and both legs itchy  Plan:   -increase hydroxyzine to 25mg QID  -if not effective consider trial benadryl, gabapentin and/or pramoxine cream    MED REC REQUIRED  Post Medication Reconciliation Status: medication reconcilation previously completed during another office visit          Electronically signed by: BRAYDEN Storey CNP          Sincerely,        BRAYDEN Storey CNP

## 2024-04-29 NOTE — PROGRESS NOTES
[facility] GERIATRICS    Chief Complaint   Patient presents with    RECHECK     HPI:  Johnson Acosta is a 89 year old  (5/22/1929), who is being seen today for an episodic care visit at: [facility] () [[id number]]. Today's concern is:   1. Cerebrovascular accident (CVA), unspecified mechanism (H)    2. Left hemiplegia (H)    3. Itching      Patient seen for follow up, post CVA (3rd) 1/20/24 with moderate to severe L side hemiplegia, unable to return home due to care needs, cognitive intact with intermittent periods of confusion, recent increase in itching, uses R hand to scratch the other arm, across abdomen and both legs, small scabs and scratch marks, trial zofran not effective, started hydroxyzine with limited efficacy, overall appears healthy.    Allergies, and PMH/PSH reviewed in EPIC today.  REVIEW OF SYSTEMS:  4 point ROS including Respiratory, CV, GI and , other than that noted in the HPI,  is negative    Objective:   /63   Pulse 80   Temp 97.3  F (36.3  C)   Resp 16   Wt 78.5 kg (173 lb)   SpO2 95%   BMI 25.55 kg/m    GENERAL APPEARANCE:  in no distress, appears healthy  ENT:  Mouth and posterior oropharynx normal, moist mucous membranes  RESP:  lungs clear to auscultation , no respiratory distress  CV:  regular rate and rhythm, no murmur, rub, or gallop, no edema  ABDOMEN:  no guarding or rebound  M/S:   Gait and station abnormal unsteady  SKIN:  Inspection of skin and subcutaneous tissue baseline  NEURO:   Examination of sensation by touch normal  PSYCH:  affect and mood normal    Labs done in SNF are in [facility]. Please refer to them using [employer]/Care Everywhere.    Assessment/Plan:  (I63.9) Cerebrovascular accident (CVA), unspecified mechanism (H)  (primary encounter diagnosis)  (G81.94) Left hemiplegia (H)  Comment: CVA 1/20, limited improvement in L hemiplegia  Plan: continue PT/OT for now  -continue statin, xarelto  -follow up neurology PRN  -plans to stay at [facility] versus return home or live with family    (L29.9) Itching  Comment: torso, L arm and both legs itchy  Plan:   -increase hydroxyzine to 25mg QID  -if not effective consider trial benadryl, gabapentin and/or pramoxine cream    MED REC REQUIRED  Post Medication Reconciliation Status: medication reconcilation previously completed during another office visit          Electronically signed by: BRAYDEN Storey CNP

## 2024-05-02 NOTE — LETTER
5/2/2024        RE: Johnson Acosta  [address]  [place] [address]        [facility] GERIATRICS    Chief Complaint   Patient presents with     RECHECK     HPI:  Johnson Acosta is a 89 year old  (5/22/1929), who is being seen today for an episodic care visit at: [facility] () [[id number]]. Today's concern is:   1. Cerebrovascular accident (CVA), unspecified mechanism (H)    2. Left hemiplegia (H)    3. Itching      Patient seen for follow up, post CVA, no repeat s/s stroke since arrival, severe L hemiplegia, now having increased pruritus on L arm, across abdomen and top of legs, skin is intact with pink scratch areas and random small scabs, trial zyrtec not effective and now hydroxyzine 25mg QID not effective, is using house lotion daily, no change in soaps/detergents.    Allergies, and PMH/PSH reviewed in EPIC today.  REVIEW OF SYSTEMS:  4 point ROS including Respiratory, CV, GI and , other than that noted in the HPI,  is negative    Objective:   BP (!) 154/66   Pulse 62   Temp 97.3  F (36.3  C)   Resp 16   Wt 75.8 kg (167 lb 3.2 oz)   SpO2 97%   BMI 24.69 kg/m    GENERAL APPEARANCE:  in no distress, appears healthy  ENT:  Mouth and posterior oropharynx normal, moist mucous membranes  RESP:  lungs clear to auscultation , no respiratory distress  CV:  regular rate and rhythm, no murmur, rub, or gallop, no edema  ABDOMEN:  bowel sounds normal  M/S:   Gait and station abnormal using WC  SKIN:  Inspection of skin and subcutaneous tissue baseline  NEURO:   Examination of sensation by touch normal  PSYCH:  affect and mood normal    Labs done in SNF are in [facility]. Please refer to them using [facility]/Care Everywhere.    Assessment/Plan:  (I63.9) Cerebrovascular accident (CVA), unspecified mechanism (H)  (primary encounter diagnosis)  (G81.94) Left hemiplegia (H)  Comment: CVA 1/20, severe L hemiplegia  Plan: PT/OT working with  -continue statin and xarelto  -follow up  neurology PRN    (L29.9) Itching  Comment: multiple modalities to treat not effective  Plan:  -increase hydroxyzine from 25 to 50mg QID scheduled  -discontinue house lotion  -start aquaphor apply at bedtime to abdomen, arms and legs  -staff to monitor  -if not effective next plan is to trial gabapentin, then benadryl, then cerave itch relief cream    MED REC REQUIRED  Post Medication Reconciliation Status: medication reconcilation previously completed during another office visit      Electronically signed by: BRAYDEN Storey CNP          Sincerely,        BRAYDEN tSorey CNP

## 2024-05-02 NOTE — PROGRESS NOTES
[facility] GERIATRICS    Chief Complaint   Patient presents with    RECHECK     HPI:  Johnson Acosta is a 89 year old  (5/22/1929), who is being seen today for an episodic care visit at: [facility] () [[id number]]. Today's concern is:   1. Cerebrovascular accident (CVA), unspecified mechanism (H)    2. Left hemiplegia (H)    3. Itching      Patient seen for follow up, post CVA, no repeat s/s stroke since arrival, severe L hemiplegia, now having increased pruritus on L arm, across abdomen and top of legs, skin is intact with pink scratch areas and random small scabs, trial zyrtec not effective and now hydroxyzine 25mg QID not effective, is using house lotion daily, no change in soaps/detergents.    Allergies, and PMH/PSH reviewed in EPIC today.  REVIEW OF SYSTEMS:  4 point ROS including Respiratory, CV, GI and , other than that noted in the HPI,  is negative    Objective:   BP (!) 154/66   Pulse 62   Temp 97.3  F (36.3  C)   Resp 16   Wt 75.8 kg (167 lb 3.2 oz)   SpO2 97%   BMI 24.69 kg/m    GENERAL APPEARANCE:  in no distress, appears healthy  ENT:  Mouth and posterior oropharynx normal, moist mucous membranes  RESP:  lungs clear to auscultation , no respiratory distress  CV:  regular rate and rhythm, no murmur, rub, or gallop, no edema  ABDOMEN:  bowel sounds normal  M/S:   Gait and station abnormal using WC  SKIN:  Inspection of skin and subcutaneous tissue baseline  NEURO:   Examination of sensation by touch normal  PSYCH:  affect and mood normal    Labs done in SNF are in [facility]. Please refer to them using [employer]/Care Everywhere.    Assessment/Plan:  (I63.9) Cerebrovascular accident (CVA), unspecified mechanism (H)  (primary encounter diagnosis)  (G81.94) Left hemiplegia (H)  Comment: CVA 1/20, severe L hemiplegia  Plan: PT/OT working with  -continue statin and xarelto  -follow up neurology PRN    (L29.9) Itching  Comment: multiple modalities to treat not  effective  Plan:  -increase hydroxyzine from 25 to 50mg QID scheduled  -discontinue house lotion  -start aquaphor apply at bedtime to abdomen, arms and legs  -staff to monitor  -if not effective next plan is to trial gabapentin, then benadryl, then cerave itch relief cream    MED REC REQUIRED  Post Medication Reconciliation Status: medication reconcilation previously completed during another office visit      Electronically signed by: BRAYDEN Storey CNP

## 2024-05-06 NOTE — PROGRESS NOTES
[facility] GERIATRICS    Chief Complaint   Patient presents with    RECHECK     HPI:  Johnson Acosta is a 89 year old  (5/22/1929), who is being seen today for an episodic care visit at: [facility] () [[id number]]. Today's concern is:   1. Cerebrovascular accident (CVA), unspecified mechanism (H)    2. Left hemiplegia (H)    3. Itching      Patient seen for follow up, post CVA with severe L hemiplegia, now pruritus and scratching abdomen, flank and L arm along with thighs, mild improvement with increase in hydroxyzine and lotion change but still itchy, overall appears healthy.    Allergies, and PMH/PSH reviewed in EPIC today.  REVIEW OF SYSTEMS:  4 point ROS including Respiratory, CV, GI and , other than that noted in the HPI,  is negative    Objective:   BP (!) 154/66   Pulse 62   Temp 97.3  F (36.3  C)   Resp 16   Wt 75.8 kg (167 lb 3.2 oz)   SpO2 97%   BMI 24.69 kg/m    GENERAL APPEARANCE:  in no distress, appears healthy  ENT:  Mouth and posterior oropharynx normal, moist mucous membranes  RESP:  lungs clear to auscultation , no respiratory distress  CV:  regular rate and rhythm, no murmur, rub, or gallop, no edema  ABDOMEN:  bowel sounds normal  M/S:   Gait and station abnormal unsteady  SKIN:  Inspection of skin and subcutaneous tissue baseline  NEURO:   Examination of sensation by touch normal  PSYCH:  affect and mood normal    Labs done in SNF are in [facility]. Please refer to them using [facility]/Care Everywhere.    Assessment/Plan:  (I63.9) Cerebrovascular accident (CVA), unspecified mechanism (H)  (primary encounter diagnosis)  (G81.94) Left hemiplegia (H)  Comment: mild cognitive limitations, severe hemiplegia  Plan: continue xarelto and statin  -follow up neurology PRN    (L29.9) Itching  Comment: as above, mild improvement  Plan: continue hydroxyzine 50mg QID and aquaphor lotion  -consider cerave vs aquaphor, also may trial gabapentin, prednisone or benadryl if bothersome  again    MED REC REQUIRED  Post Medication Reconciliation Status: medication reconcilation previously completed during another office visit        Electronically signed by: BRAYDEN Storey CNP

## 2024-05-06 NOTE — LETTER
5/6/2024        RE: Johnson Acosta  [address]  [facility] [address]        [facility] GERIATRICS    Chief Complaint   Patient presents with     RECHECK     HPI:  Johnson Acosta is a 89 year old  (5/22/1929), who is being seen today for an episodic care visit at: [facility] () [[id number]]. Today's concern is:   1. Cerebrovascular accident (CVA), unspecified mechanism (H)    2. Left hemiplegia (H)    3. Itching      Patient seen for follow up, post CVA with severe L hemiplegia, now pruritus and scratching abdomen, flank and L arm along with thighs, mild improvement with increase in hydroxyzine and lotion change but still itchy, overall appears healthy.    Allergies, and PMH/PSH reviewed in EPIC today.  REVIEW OF SYSTEMS:  4 point ROS including Respiratory, CV, GI and , other than that noted in the HPI,  is negative    Objective:   BP (!) 154/66   Pulse 62   Temp 97.3  F (36.3  C)   Resp 16   Wt 75.8 kg (167 lb 3.2 oz)   SpO2 97%   BMI 24.69 kg/m    GENERAL APPEARANCE:  in no distress, appears healthy  ENT:  Mouth and posterior oropharynx normal, moist mucous membranes  RESP:  lungs clear to auscultation , no respiratory distress  CV:  regular rate and rhythm, no murmur, rub, or gallop, no edema  ABDOMEN:  bowel sounds normal  M/S:   Gait and station abnormal unsteady  SKIN:  Inspection of skin and subcutaneous tissue baseline  NEURO:   Examination of sensation by touch normal  PSYCH:  affect and mood normal    Labs done in SNF are in [facility]. Please refer to them using [employer]/Care Everywhere.    Assessment/Plan:  (I63.9) Cerebrovascular accident (CVA), unspecified mechanism (H)  (primary encounter diagnosis)  (G81.94) Left hemiplegia (H)  Comment: mild cognitive limitations, severe hemiplegia  Plan: continue xarelto and statin  -follow up neurology PRN    (L29.9) Itching  Comment: as above, mild improvement  Plan: continue hydroxyzine 50mg QID and aquaphor  lotion  -consider cerave vs aquaphor, also may trial gabapentin, prednisone or benadryl if bothersome again    MED REC REQUIRED  Post Medication Reconciliation Status: medication reconcilation previously completed during another office visit        Electronically signed by: BRAYDEN Storey CNP          Sincerely,        BRAYDEN Storey CNP

## 2024-06-06 NOTE — PROGRESS NOTES
[facility] Care Coordination Contact    Member became effective with [facility] on 6/1/2024 with Lenka MELISSA.  Previous Health Plan: MA/Fee For Service  Previous Care System:  unknown  Previous care coordinators name and number: unknown  Waiver Type: N/A  Last MMIS Entry: Date 1/23/24 and Type 07 SHORT NF  MMIS visit date (and type) if different from above: None  UTF received: No UTF to request  Mailed welcome letter.   Address/Phone discrepancy: NA  [employer]: ELIZABETH Henao  Care Management Specialist   [facility]   [phone]

## 2024-06-06 NOTE — LETTER
June 6, 2024    TING ROGERKALIRICKI  [facility]  [address] [place] [address]      Dear Ting,    Welcome to [facility]'s [facility] Options ([facility] ([facility]) plan. My name is MARISOL Martinez, RN, PHN, [facility]. I am your [facility] care coordinator. You are eligible for Care Coordination through [facility].    Here is how Care Coordination works:  I will meet with you to discuss your care needs and health goals.  I will work with your facility to ensure your care needs are being met.  I will review the facility's plan of care for you and help them meet your needs.  If you are discharged from the nursing home, I will help you return to the community.    Our goal is to provide services that will keep you as healthy and independent as possible.     [facility] combines the benefits you may already receive from Medical Assistance, Medicare, and the Prescription Drug Coverage Program. Soon you will receive a new [facility] member identification (ID) card from [facility]. When you receive it, please use this card whenever you get health services.    Being in the [facility] Health Options ([facility]) ([facility]) Care Coordination program is voluntary and offered to you at no cost.  If you ever wish to stop being in the Care Coordination program or have questions, call me at [phone]. If you reach my voice mail, leave a message and your phone number. If you are hearing impaired, call the [place] [employer] at 007 or [phone] (eoyncp-ym-dnglxw relay service).    Sincerely,      MARISOL Martinez, RN, PHN, [facility]  [phone]  [email or web address] ([facility]) is a health plan that contracts with both Medicare and the [place] Medical Assistance (Medicaid) program to provide benefits of both programs to enrollees. Enrollment in [facility] depends on contract renewal.    [id number] accepted  (12/2019)

## 2024-06-16 NOTE — TELEPHONE ENCOUNTER
On call note: Nurse called, reported fall on Friday. Patient now with left ankle swelling.     Epic chart and notes reviewed. Triage note reviewed.     Assessment : Patient given tylenol for pain, no redness. Discussed with nurse     Plan: xray 2 views, NWB to left ankle until results back, ice and tylenol for pain. Please call back if further concerns.     Electronically signed by  BRAYDEN Montero, GNP/ANP-BC  June 16, 2024

## 2024-06-17 NOTE — LETTER
6/17/2024      Johnson Acosta  C/o Elder Acosta  [address] [facility] [address]        [facility] GERIATRICS  Chief Complaint   Patient presents with     group home Regulatory     FVP Care Coordination - Health Plan or Product Change     [facility] Medical Record Number:  [id number]  Place of Service where encounter took place:  [facility] () [[id number]]    HPI:    Johnson Acosta  is 89 year old (5/22/1929), who is being seen today for a federally mandated E/M visit. Today's concerns are:     Cerebrovascular accident (CVA), unspecified mechanism (H)  Left hemiplegia (H)  Major depressive disorder in partial remission, unspecified whether recurrent (H24)  Itching    Patient seen for follow up, CVA in Jan, changed independent lifestyle to LTC due to support needs, cognitive fairly intact, severe L hemiplegia, changes also causing certain s/s depression, limited motivation, I&O adequate, chronic pruritus on torso abdomen back and arms, scratch marks mild, no s/s infection, increased itchiness when asked about it, appears healthy for age.    ALLERGIES:Seasonal allergies  PAST MEDICAL HISTORY:   Past Medical History:   Diagnosis Date     Cervical vertebral fracture (H)      8/09  Treated non surgically     C1 blow out fx  Vertebral arteries intact     Gastroesophageal reflux disease      PAST SURGICAL HISTORY:   has a past surgical history that includes REMV CATARACT EXTRACAP,INSERT LENS, W/O ECP (5/22/2003) and Laser YAG capsulotomy (7/21/2011).  FAMILY HISTORY: family history is not on file.  SOCIAL HISTORY:      MEDICATIONS:  MED REC REQUIRED  Post Medication Reconciliation Status: medication reconcilation previously completed during another office visit         Review of your medicines            Accurate as of June 17, 2024  2:51 PM. If you have any questions, ask your nurse or doctor.                CONTINUE these medicines which have NOT CHANGED        Dose / Directions    acetaminophen 500 MG tablet  Commonly known as: TYLENOL  Indication: Pain      Dose: 1,000 mg  Take 1,000 mg by mouth 3 times daily  Refills: 0     albuterol 108 (90 Base) MCG/ACT inhaler  Commonly known as: PROAIR HFA/PROVENTIL HFA/VENTOLIN HFA      Dose: 1-2 puff  Inhale 1-2 puffs into the lungs every 4 hours as needed for shortness of breath, wheezing or cough  Refills: 0     amLODIPine 10 MG tablet  Commonly known as: NORVASC  Indication: High Blood Pressure Disorder      Dose: 10 mg  Take 10 mg by mouth daily  Refills: 0     Calcium Carb-Cholecalciferol 500-5 MG-MCG Tabs  Indication: Low Amount of Calcium in the Blood      Dose: 1 tablet  Take 1 tablet by mouth daily  Refills: 0     fish Oil 1200 MG capsule      Dose: 1,200 mg  Take 1,200 mg by mouth daily  Refills: 0     guaiFENesin 20 mg/mL liquid  Commonly known as: ROBITUSSIN      Dose: 200 mg  Take 200 mg by mouth every 4 hours as needed for cough  Refills: 0     hydrOXYzine HCl 25 MG tablet  Commonly known as: ATARAX  Used for: Itching      Dose: 50 mg  Take 2 tablets (50 mg) by mouth 4 times daily  Refills: 0     Lipitor 20 MG tablet  Indication: High Amount of Fats in the Blood  Generic drug: atorvastatin      Dose: 40 mg  Take 40 mg by mouth daily  Refills: 0     lisinopril 10 MG tablet  Commonly known as: ZESTRIL  Used for: Hypertension, unspecified type      Dose: 10 mg  Take 1 tablet (10 mg) by mouth daily  Refills: 0     metoprolol tartrate 25 MG tablet  Commonly known as: LOPRESSOR  Indication: High Blood Pressure Disorder      Dose: 25 mg  Take 25 mg by mouth 2 times daily  Refills: 0     oxymetazoline 0.05 % nasal spray  Commonly known as: AFRIN  Used for: Epistaxis      Dose: 2 spray  Spray 0.2 mLs (2 sprays) into both nostrils 2 times daily as needed for congestion  Refills: 0     polyethylene glycol 17 GM/Dose powder  Commonly known as: MIRALAX      Dose: 1 Capful  Take 1 Capful by mouth daily as needed for constipation  Refills: 0      rivaroxaban ANTICOAGULANT 15 MG Tabs tablet  Commonly known as: XARELTO  Indication: Atrial Fibrillation Not Caused By A Heart Valve Problem      Dose: 15 mg  Take 15 mg by mouth daily  Refills: 0     traZODone 50 MG tablet  Commonly known as: DESYREL  Indication: Trouble Sleeping      Dose: 25 mg  Take 25 mg by mouth at bedtime  Refills: 0     Vitamin D3 25 mcg (1000 units) tablet  Commonly known as: CHOLECALCIFEROL  Indication: Vitamin D Deficiency      Dose: 1 tablet  Take 1 tablet by mouth daily  Refills: 0               Case Management:  I have reviewed the care plan and MDS and do agree with the plan. Patient's desire to return to the community is present, but is not able due to care needs . Information reviewed:  Medications, vital signs, orders, and nursing notes.    ROS:  4 point ROS including Respiratory, CV, GI and , other than that noted in the HPI,  is negative    Vitals:  /66   Pulse 76   Temp 97.7  F (36.5  C)   Resp 16   Wt 84.6 kg (186 lb 6.4 oz)   SpO2 95%   BMI 27.53 kg/m    Body mass index is 27.53 kg/m .  Exam:  GENERAL APPEARANCE:  in no distress, appears healthy  ENT:  Mouth and posterior oropharynx normal, moist mucous membranes  RESP:  lungs clear to auscultation , no respiratory distress  CV:  no edema, rate-normal  ABDOMEN:  bowel sounds normal  M/S:   Gait and station abnormal transfer assist  SKIN:  Inspection of skin and subcutaneous tissue baseline  NEURO:   Examination of sensation by touch normal  PSYCH:  affect and mood normal    Lab/Diagnostic data:   Labs done in SNF are in [facility]. Please refer to them using [employer]/Care Everywhere.    ASSESSMENT/PLAN  (I63.9) Cerebrovascular accident (CVA), unspecified mechanism (H)  (primary encounter diagnosis)  (G81.94) Left hemiplegia (H)  Comment: cog intact, severe L hemiplegia  Plan: continue metoprolol, xarelto, statin  -follow up neuro PRN    (F32.4) Major depressive disorder in partial remission, unspecified whether  recurrent (H24)  Comment: mild symptoms, intermittent  Plan: ACP to eval/treat  -consider celexa trial pending ACP eval    (L29.9) Itching  Comment: torso, arms itchy, moderate control, zyrtec not effective  Plan: continue hydroxyzine 50mg QID  -if itching worse consider gabapentin, prednisone and/or benadryl      Electronically signed by:  BRAYDEN Storey CNP     The health plan new enrollment has happened. I have reviewed the  MDS, the preventative needs,  and facility care plan. The level of care is appropriate. I have reviewed the code status/advanced directives.        Sincerely,        BRAYDEN Storey CNP

## 2024-06-18 NOTE — PROGRESS NOTES
[facility] Care Coordination Contact:       Emailed Kimmy ALANIZ at NH and NP via [employer] regarding my upcoming visit to facility on 6/19/24.    *fall 6/14 d/t attempting to self transfer to toilet-no injury.  Weight 176.4 Lbs Stable. 1A with all ADL's - Not be left alone BR. Staff moves resident in w/c. Left hand edema- from non dominant arm hanging -to elevate -Denies pain. Resident is doing well to unit 300 and daily routine.    RNCC will visit Member on 6/19/24      Opal Montero RN  Care Coordinator-Long Term Care  [address] [address]  [email or web address]   [email or web address]     Office: [phone]   Fax: [phone]

## 2024-06-19 NOTE — PROGRESS NOTES
[facility] Institutional Initial Assessment     Institutional Assessment for Health Risk Assessment with Johnson Acosta completed on June 19th 2024 at [facility]    Type of residence:: Nursing home  Current living arrangement:: I live in a nursing home     Assessment completed with:: Patient, Care Team Member, -chart review    Mental/Behavioral Health   Depression Screening:           Mental health DX:: Yes   Mental health DX how managed:: None    Falls Assessment:   Fallen 2 or more times in the past year?: (!) Yes   Any fall with injury in the past year?: No    ADL/IADL Dependencies:   Dependent ADLs:: Bathing, Dressing, Grooming, Incontinence, Transfers, Wheelchair-with assist, Toileting, Eating  Dependent IADLs:: Cleaning, Cooking, Laundry, Shopping, Meal Preparation, Medication Management, Money Management, Transportation, Incontinence      Care Plan & Recommendations: Met with Johnson at [facility], introduced self and role.  Roscoe was in his room sitting in his wheelchair.  He was appropriately dressed and appeared well-kept.  Johnson states he is doing okay, he has noticeably swollen left hand.  He states he has left shoulder  pain which she gets Tylenol for.  He states he wears glasses and [facility].  He also [facility].  He states that staff helps him get dressed and wheeled him from place to place.  He tells me he used to be a .  He states otherwise he is doing okay.  Discussed options/opportunities for transitions.    See Institutional Care Plan for detailed assessment information.    Obtained a copy of the facility care plan and MDS from facility electronic records. Requested of [facility] social worker to put this care coordinator on care conference attendee list.    Placed the Health Plan facility face sheet in the member's facility chart.    Follow-Up Plan: Member  informed of future contact, plan to f/u with member with a 6 month assessment, attend 1 care conference annually, and will follow any hospitalizations or transitions. Care Coordinator contact information shared with member/family and facility, and encouraged to call this care coordinator with any questions or concerns at any time.     [facility] care continuum providers: Please see Snapshot and Care Management Flowsheets for Specific details of care plan.    This CC note routed to PCP, Boogie Lazo.    MARISOL Martinez, RN, PHN, [facility]  Care Coordinator[place] [address]  [email or web address]   [email or web address]     Office: [phone]   Fax: [phone]

## 2024-06-20 PROBLEM — R60.9 EDEMA, UNSPECIFIED TYPE: Status: ACTIVE | Noted: 2024-01-01

## 2024-06-20 NOTE — PROGRESS NOTES
[facility] GERIATRICS    Chief Complaint   Patient presents with    RECHECK     HPI:  Johnson Acosta is a 89 year old  (5/22/1929), who is being seen today for an episodic care visit at: [facility] () [[id number]]. Today's concern is:   1. Cerebrovascular accident (CVA), unspecified mechanism (H)    2. Left hemiplegia (H)    3. Edema, unspecified type      Patient seen for follow up, also called Lucrecia DE LEÓN today RE: status and plan, patient post CVA with severe L hemiplegia, oriented, unable to move limbs but able to wiggle finger/toe slightly, having edema L arm and leg/pedal due to not constricting muscles, in dependent position, not elevating and refusing compression, potential diuretic candidate, SBP's in the 110-160 range, Hr's70's, overall appears healthy.    Allergies, and PMH/PSH reviewed in EPIC today.  REVIEW OF SYSTEMS:  4 point ROS including Respiratory, CV, GI and , other than that noted in the HPI,  is negative    Objective:   /61   Pulse 74   Temp 97.2  F (36.2  C)   Resp 16   Wt 83 kg (183 lb)   SpO2 90%   BMI 27.02 kg/m    GENERAL APPEARANCE:  in no distress, appears healthy  ENT:  Mouth and posterior oropharynx normal, moist mucous membranes  RESP:  lungs clear to auscultation , no respiratory distress  CV:  peripheral edema moderate+ in R extremities, rate-normal  ABDOMEN:  bowel sounds normal  M/S:   Gait and station abnormal unsteady  SKIN:  Inspection of skin and subcutaneous tissue baseline  NEURO:   Examination of sensation by touch normal  PSYCH:  affect and mood normal    Labs done in SNF are in [facility]. Please refer to them using EPIC/Care Everywhere.    Assessment/Plan:  (I63.9) Cerebrovascular accident (CVA), unspecified mechanism (H)  (primary encounter diagnosis)  (G81.94) Left hemiplegia (H)  (R60.9) Edema, unspecified type  Comment: post CVA, sever R hemiplegia, mild hypervolemia  Plan: discontinue tubigrip, refuses  -encourage elevation L arm  with pillow, L leg with chair  -discontinue ulcer protection boots, refuses  -daily vitals  -consider decreasing metoprolol and starting bumex if fluid retention exacerbates (renal OK, SBP's OK)  -CBC, BMP, TSH in 2-3 months    MED REC REQUIRED  Post Medication Reconciliation Status: medication reconcilation previously completed during another office visit          Electronically signed by: BRAYDEN Storey CNP

## 2024-06-20 NOTE — LETTER
6/20/2024      Johnson Acosta  C/o Elder Acosta  [address]  [place] [address]        [facility] GERIATRICS    Chief Complaint   Patient presents with     RECHECK     HPI:  Johnson Acosta is a 89 year old  (5/22/1929), who is being seen today for an episodic care visit at: [facility] () [[id number]]. Today's concern is:   1. Cerebrovascular accident (CVA), unspecified mechanism (H)    2. Left hemiplegia (H)    3. Edema, unspecified type      Patient seen for follow up, also called Lucrecia DE LEÓN today RE: status and plan, patient post CVA with severe L hemiplegia, oriented, unable to move limbs but able to wiggle finger/toe slightly, having edema L arm and leg/pedal due to not constricting muscles, in dependent position, not elevating and refusing compression, potential diuretic candidate, SBP's in the 110-160 range, Hr's70's, overall appears healthy.    Allergies, and PMH/PSH reviewed in EPIC today.  REVIEW OF SYSTEMS:  4 point ROS including Respiratory, CV, GI and , other than that noted in the HPI,  is negative    Objective:   /61   Pulse 74   Temp 97.2  F (36.2  C)   Resp 16   Wt 83 kg (183 lb)   SpO2 90%   BMI 27.02 kg/m    GENERAL APPEARANCE:  in no distress, appears healthy  ENT:  Mouth and posterior oropharynx normal, moist mucous membranes  RESP:  lungs clear to auscultation , no respiratory distress  CV:  peripheral edema moderate+ in R extremities, rate-normal  ABDOMEN:  bowel sounds normal  M/S:   Gait and station abnormal unsteady  SKIN:  Inspection of skin and subcutaneous tissue baseline  NEURO:   Examination of sensation by touch normal  PSYCH:  affect and mood normal    Labs done in SNF are in [facility]. Please refer to them using [employer]/Care Everywhere.    Assessment/Plan:  (I63.9) Cerebrovascular accident (CVA), unspecified mechanism (H)  (primary encounter diagnosis)  (G81.94) Left hemiplegia (H)  (R60.9) Edema, unspecified type  Comment: post CVA,  sever R hemiplegia, mild hypervolemia  Plan: discontinue tubigrip, refuses  -encourage elevation L arm with pillow, L leg with chair  -discontinue ulcer protection boots, refuses  -daily vitals  -consider decreasing metoprolol and starting bumex if fluid retention exacerbates (renal OK, SBP's OK)  -CBC, BMP, TSH in 2-3 months    MED REC REQUIRED  Post Medication Reconciliation Status: medication reconcilation previously completed during another office visit          Electronically signed by: BRAYDEN Storey CNP          Sincerely,        BRAYDEN Storey CNP

## 2024-07-01 NOTE — PROGRESS NOTES
[facility] GERIATRICS    Chief Complaint   Patient presents with    RECHECK     HPI:  Johnson Acosta is a 89 year old  (5/22/1929), who is being seen today for an episodic care visit at: [facility] () [[id number]]. Today's concern is:   1. Cerebrovascular accident (CVA), unspecified mechanism (H)    2. Left hemiplegia (H)    3. Itching      Patient seen for follow up, family also present today to deliver [facility], is post CVA with severe L hemiplegia, states that xarelto is very expensive and requesting something cheaper, only alternatives are warfarin with INR checks or ASA, all parties agree to start ASA, also itching mild improvement, skin intact, overall appears healthy for age.    Allergies, and PMH/PSH reviewed in EPIC today.  REVIEW OF SYSTEMS:  4 point ROS including Respiratory, CV, GI and , other than that noted in the HPI,  is negative    Objective:   /66   Pulse 65   Temp 97.3  F (36.3  C)   Resp 18   Wt 82.9 kg (182 lb 12.8 oz)   SpO2 92%   BMI 26.99 kg/m    GENERAL APPEARANCE:  in no distress, appears healthy  ENT:  Mouth and posterior oropharynx normal, moist mucous membranes  RESP:  lungs clear to auscultation , no respiratory distress  CV:  peripheral edema mild+ in lower legs, L arm, rate-normal  ABDOMEN:  bowel sounds normal  M/S:   Gait and station abnormal unsteady  SKIN:  Inspection of skin and subcutaneous tissue baseline  NEURO:   Examination of sensation by touch normal  PSYCH:  affect and mood normal    Labs done in SNF are in [facility]. Please refer to them using [employer]/Care Everywhere.    Assessment/Plan:  (I63.9) Cerebrovascular accident (CVA), unspecified mechanism (H)  (primary encounter diagnosis)  (G81.94) Left hemiplegia (H)  Comment: post CVA with severe L hemiplegia  Plan: new WC from [facility] today  -discontinue xarelto  -start [id number]  -monitor for s/s bleed/bruise  -discontinue VitaD per pharm rec    (L29.9) Itching  Comment:  improved, skin intact  Plan: continue hydroxyzine TID  -if not effective consider gabapentin, prednisone and/or benadryl    MED REC REQUIRED  Post Medication Reconciliation Status: medication reconcilation previously completed during another office visit        Electronically signed by: BRAYDEN Storey CNP

## 2024-07-01 NOTE — LETTER
7/1/2024      Johnson Acosta  C/o Elder Acosta  [address] [facility] [address]        [facility] GERIATRICS    Chief Complaint   Patient presents with     RECHECK     HPI:  Johnson Acosta is a 89 year old  (5/22/1929), who is being seen today for an episodic care visit at: [facility] () [[id number]]. Today's concern is:   1. Cerebrovascular accident (CVA), unspecified mechanism (H)    2. Left hemiplegia (H)    3. Itching      Patient seen for follow up, family also present today to deliver [facility], is post CVA with severe L hemiplegia, states that xarelto is very expensive and requesting something cheaper, only alternatives are warfarin with INR checks or ASA, all parties agree to start ASA, also itching mild improvement, skin intact, overall appears healthy for age.    Allergies, and PMH/PSH reviewed in EPIC today.  REVIEW OF SYSTEMS:  4 point ROS including Respiratory, CV, GI and , other than that noted in the HPI,  is negative    Objective:   /66   Pulse 65   Temp 97.3  F (36.3  C)   Resp 18   Wt 82.9 kg (182 lb 12.8 oz)   SpO2 92%   BMI 26.99 kg/m    GENERAL APPEARANCE:  in no distress, appears healthy  ENT:  Mouth and posterior oropharynx normal, moist mucous membranes  RESP:  lungs clear to auscultation , no respiratory distress  CV:  peripheral edema mild+ in lower legs, L arm, rate-normal  ABDOMEN:  bowel sounds normal  M/S:   Gait and station abnormal unsteady  SKIN:  Inspection of skin and subcutaneous tissue baseline  NEURO:   Examination of sensation by touch normal  PSYCH:  affect and mood normal    Labs done in SNF are in [place] [facility]. Please refer to them using EPIC/Care Everywhere.    Assessment/Plan:  (I63.9) Cerebrovascular accident (CVA), unspecified mechanism (H)  (primary encounter diagnosis)  (G81.94) Left hemiplegia (H)  Comment: post CVA with severe L hemiplegia  Plan: new WC from [facility] today  -discontinue xarelto  -start  [id number]  -monitor for s/s bleed/bruise  -discontinue VitaD per pharm rec    (L29.9) Itching  Comment: improved, skin intact  Plan: continue hydroxyzine TID  -if not effective consider gabapentin, prednisone and/or benadryl    MED REC REQUIRED  Post Medication Reconciliation Status: medication reconcilation previously completed during another office visit        Electronically signed by: BRAYDEN Storey CNP          Sincerely,        BRAYDEN Storey CNP

## 2024-07-15 NOTE — LETTER
7/15/2024      Johnson Acosta  C/o Eledr Acosta  [address]  [place] [address]        [facility] GERIATRICS    Chief Complaint   Patient presents with     RECHECK     HPI:  Johnson Acosta is a 89 year old  (5/22/1929), who is being seen today for an episodic care visit at: [facility] () [[id number]]. Today's concern is:   1. Cerebrovascular accident (CVA), unspecified mechanism (H)    2. Weight loss    3. Lethargy      Patient seen for follow up, CVA in Jan 2024 with severe L hemiplegia, now weight loss from 182 to 174 to 169lbs, increased confusion, lethargy, not thriving, overall change in status, difficulty with larger pills, transfer requires increased assist, states feeling OK.    Allergies, and PMH/PSH reviewed in EPIC today.  REVIEW OF SYSTEMS:  4 point ROS including Respiratory, CV, GI and , other than that noted in the HPI,  is negative    Objective:   /63   Pulse 64   Temp 97.5  F (36.4  C)   Resp 15   Wt 78.9 kg (174 lb)   SpO2 98%   BMI 25.70 kg/m    GENERAL APPEARANCE:  in no distress, appears healthy  ENT:  Mouth and posterior oropharynx normal, moist mucous membranes  RESP:  lungs clear to auscultation , no respiratory distress  CV:  peripheral edema mild+ in L arm and leg, rate-normal  ABDOMEN:  bowel sounds normal  M/S:   Gait and station abnormal transfer assist  SKIN:  Inspection of skin and subcutaneous tissue baseline  NEURO:   Examination of sensation by touch normal  PSYCH:  affect and mood normal    Labs done in SNF are in [facility]. Please refer to them using [employer]/Care Everywhere.    Assessment/Plan:  (I63.9) Cerebrovascular accident (CVA), unspecified mechanism (H)  (primary encounter diagnosis)  (R63.4) Weight loss  (R53.83) Lethargy  Comment: overall not thriving, severe hemiplegia, weight loss  Plan:   -PT/OT eval and treat for transfers, exercise  -CBC, BMP, TSH on 7/17  -discontinue fish oil  -consider further med reduction of  non-essentials  -pending lab results and status, may contact family regarding potential hospice/comfort care program    MED REC REQUIRED  Post Medication Reconciliation Status: medication reconcilation previously completed during another office visit          Electronically signed by: BRAYDEN Storey CNP          Sincerely,        BRAYDEN Storey CNP

## 2024-07-15 NOTE — PROGRESS NOTES
[facility] GERIATRICS    Chief Complaint   Patient presents with    RECHECK     HPI:  Johnson Acosta is a 89 year old  (5/22/1929), who is being seen today for an episodic care visit at: [facility] () [[id number]]. Today's concern is:   1. Cerebrovascular accident (CVA), unspecified mechanism (H)    2. Weight loss    3. Lethargy      Patient seen for follow up, CVA in Jan 2024 with severe L hemiplegia, now weight loss from 182 to 174 to 169lbs, increased confusion, lethargy, not thriving, overall change in status, difficulty with larger pills, transfer requires increased assist, states feeling OK.    Allergies, and PMH/PSH reviewed in EPIC today.  REVIEW OF SYSTEMS:  4 point ROS including Respiratory, CV, GI and , other than that noted in the HPI,  is negative    Objective:   /63   Pulse 64   Temp 97.5  F (36.4  C)   Resp 15   Wt 78.9 kg (174 lb)   SpO2 98%   BMI 25.70 kg/m    GENERAL APPEARANCE:  in no distress, appears healthy  ENT:  Mouth and posterior oropharynx normal, moist mucous membranes  RESP:  lungs clear to auscultation , no respiratory distress  CV:  peripheral edema mild+ in L arm and leg, rate-normal  ABDOMEN:  bowel sounds normal  M/S:   Gait and station abnormal transfer assist  SKIN:  Inspection of skin and subcutaneous tissue baseline  NEURO:   Examination of sensation by touch normal  PSYCH:  affect and mood normal    Labs done in SNF are in [place] EPIC. Please refer to them using [facility]/Care Everywhere.    Assessment/Plan:  (I63.9) Cerebrovascular accident (CVA), unspecified mechanism (H)  (primary encounter diagnosis)  (R63.4) Weight loss  (R53.83) Lethargy  Comment: overall not thriving, severe hemiplegia, weight loss  Plan:   -PT/OT eval and treat for transfers, exercise  -CBC, BMP, TSH on 7/17  -discontinue fish oil  -consider further med reduction of non-essentials  -pending lab results and status, may contact family regarding potential hospice/comfort  care program    MED REC REQUIRED  Post Medication Reconciliation Status: medication reconcilation previously completed during another office visit          Electronically signed by: BRAYDEN Storey CNP

## 2024-07-16 NOTE — PROGRESS NOTES
[facility] Care Coordination Contact    Received after visit chart from care coordinator.  Completed following tasks: Mailed copy of signature sheet for member/ rep to sign and return in SASE  and Mailed [employer] POC Letter to member/rep with Support Plan.     Clara Henao  Care Management Specialist   [facility]   [phone]

## 2024-07-16 NOTE — LETTER
July 16, 2024       Johnson Acosta  C/O HALEY ACOSTA  [address]  [place] [facility] [place] [address]      Dear Johnson,    At [facility], we re dedicated to improving your health and wellness. Enclosed is the Suport Plan developed with you on 6/19/2024.     Please review this Support Plan carefully. If you find it acceptable, please sign it and return the signature page in the enclosed self-addressed, stamped envelope.     As a reminder, during your visit we talked about:    Ways to manage your physical and mental health   Using health care to maintain and improve your health   Your preventive care needs    Remember to contact your care coordinator if you:   Are hospitalized or plan to be hospitalized   Have a fall   Have a change in your physical or mental health   Need help finding supports or services     If you have questions or don t agree with your Support Plan, call me at [phone]. You can also call if your needs change. [employer] machine users please call the [place] Relay at 683 or [phone] (mvdyfo-aj-ttojti relay service).    Thank you,         CAMILO MartinezN, RN, PHN, [facility]  [phone]  [email or web address]            [id number] accepted  [id number]   (06/2024)          [address], [place] [address]  [phone]  fax [phone]   [facility]

## 2024-07-18 NOTE — PROGRESS NOTES
[facility] GERIATRICS    Chief Complaint   Patient presents with    RECHECK     HPI:  Johnson Acosta is a 89 year old  (5/22/1929), who is being seen today for an episodic care visit at: [facility] () [[id number]]. Today's concern is:   1. Cerebrovascular accident (CVA), unspecified mechanism (H)    2. Unresponsive episode    3. Hallucinations      Patient seen for follow up, eating ice cream sandwich outside, post CVA in Jan with severe L hemiplegia, on 7/17 had 30 second unresponsive episode, pale, eyes fixed, 99/63, 54, 93%, 97.9, rebounded and back to new normal, over past weeks increased weakness, now EZ stand, hallucinates seeing animals not present, is pleasant, no distress, labs on 7/17 all WNL.    Allergies, and PMH/PSH reviewed in EPIC today.  REVIEW OF SYSTEMS:  4 point ROS including Respiratory, CV, GI and , other than that noted in the HPI,  is negative    Objective:   /63   Pulse 64   Temp 97.5  F (36.4  C)   Resp 15   Wt 78.9 kg (174 lb)   SpO2 98%   BMI 25.70 kg/m    GENERAL APPEARANCE:  in no distress, appears healthy  ENT:  Mouth and posterior oropharynx normal, moist mucous membranes  RESP:  lungs clear to auscultation , no respiratory distress  CV:  no edema, irregular rhythm rate  ABDOMEN:  bowel sounds normal  M/S:   Gait and station abnormal unsteady  SKIN:  Inspection of skin and subcutaneous tissue baseline  NEURO:   Examination of sensation by touch normal  PSYCH:  affect and mood normal    Labs done in SNF are in [place] EPIC. Please refer to them using [employer]/Care Everywhere.    Assessment/Plan:  (I63.9) Cerebrovascular accident (CVA), unspecified mechanism (H)  (primary encounter diagnosis)  (R40.4) Unresponsive episode  (R44.3) Hallucinations  Comment: no distress, progressive decline over past few weeks, previously had unresponsive episode, family declined sending to ER, VS WNL  Plan:   -PT/OT eval  -hospice consult ordered  -continue current med  regimen, baseline stable  -expect repeat unresponsive episode again  -pending hospice admit, if not admitted plan to reduce med burden   -if hallucinations become distressing will intervene with seroquel    MED REC REQUIRED  Post Medication Reconciliation Status: medication reconcilation previously completed during another office visit          Electronically signed by: BRAYDEN Storey CNP

## 2024-07-18 NOTE — LETTER
7/18/2024      Johnson Acosta  C/o Elder Acosta  [address]  [place] [address]        [facility] GERIATRICS    Chief Complaint   Patient presents with     RECHECK     HPI:  Johnson Acosta is a 89 year old  (5/22/1929), who is being seen today for an episodic care visit at: [facility] () [[id number]]. Today's concern is:   1. Cerebrovascular accident (CVA), unspecified mechanism (H)    2. Unresponsive episode    3. Hallucinations      Patient seen for follow up, eating ice cream sandwich outside, post CVA in Jan with severe L hemiplegia, on 7/17 had 30 second unresponsive episode, pale, eyes fixed, 99/63, 54, 93%, 97.9, rebounded and back to new normal, over past weeks increased weakness, now EZ stand, hallucinates seeing animals not present, is pleasant, no distress, labs on 7/17 all WNL.    Allergies, and PMH/PSH reviewed in EPIC today.  REVIEW OF SYSTEMS:  4 point ROS including Respiratory, CV, GI and , other than that noted in the HPI,  is negative    Objective:   /63   Pulse 64   Temp 97.5  F (36.4  C)   Resp 15   Wt 78.9 kg (174 lb)   SpO2 98%   BMI 25.70 kg/m    GENERAL APPEARANCE:  in no distress, appears healthy  ENT:  Mouth and posterior oropharynx normal, moist mucous membranes  RESP:  lungs clear to auscultation , no respiratory distress  CV:  no edema, irregular rhythm rate  ABDOMEN:  bowel sounds normal  M/S:   Gait and station abnormal unsteady  SKIN:  Inspection of skin and subcutaneous tissue baseline  NEURO:   Examination of sensation by touch normal  PSYCH:  affect and mood normal    Labs done in SNF are in [facility]. Please refer to them using [employer]/Care Everywhere.    Assessment/Plan:  (I63.9) Cerebrovascular accident (CVA), unspecified mechanism (H)  (primary encounter diagnosis)  (R40.4) Unresponsive episode  (R44.3) Hallucinations  Comment: no distress, progressive decline over past few weeks, previously had unresponsive episode, family  declined sending to ER, VS WNL  Plan:   -PT/OT eval  -hospice consult ordered  -continue current med regimen, baseline stable  -expect repeat unresponsive episode again  -pending hospice admit, if not admitted plan to reduce med burden   -if hallucinations become distressing will intervene with seroquel    MED REC REQUIRED  Post Medication Reconciliation Status: medication reconcilation previously completed during another office visit          Electronically signed by: BRAYDEN Storey CNP          Sincerely,        BRAYDEN Storey CNP

## 2024-07-22 PROBLEM — Z51.5 HOSPICE CARE PATIENT: Status: ACTIVE | Noted: 2024-01-01

## 2024-07-22 NOTE — PROGRESS NOTES
[facility] GERIATRICS    Chief Complaint   Patient presents with    RECHECK     HPI:  Johnson Acosta is a 89 year old  (5/22/1929), who is being seen today for an episodic care visit at: [facility] () [[id number]]. Today's concern is:   1. Cerebrovascular accident (CVA), unspecified mechanism (H)    2. Dysphagia, unspecified type    3. Hospice care patient      Patient post CVA with progressive decline, recent hallucinations and unresponsive episode, signed on with [facility] 7/19, meds reviewed, also having dysphagia with coughing spells at meals, today adamant to have diet changed even with potential aspiration risk, overall appears healthy.    Allergies, and PMH/PSH reviewed in EPIC today.  REVIEW OF SYSTEMS:  4 point ROS including Respiratory, CV, GI and , other than that noted in the HPI,  is negative    Objective:   /63   Pulse 64   Temp 97.5  F (36.4  C)   Resp 15   Wt 75.7 kg (166 lb 12.8 oz)   SpO2 98%   BMI 24.63 kg/m    GENERAL APPEARANCE:  in no distress, appears healthy  ENT:  Mouth and posterior oropharynx normal, moist mucous membranes  RESP:  lungs clear to auscultation , no respiratory distress  CV:  no edema, rate-normal  ABDOMEN:  bowel sounds normal  M/S:   Gait and station abnormal unsteady  SKIN:  Inspection of skin and subcutaneous tissue baseline  NEURO:   Examination of sensation by touch normal  PSYCH:  affect and mood normal    Labs done in SNF are in [facility]. Please refer to them using [facility]/Care Everywhere.    Assessment/Plan:  (I63.9) Cerebrovascular accident (CVA), unspecified mechanism (H)  (primary encounter diagnosis)  (R13.10) Dysphagia, unspecified type  Comment: cognitive limitations, hallucinating, cough with swallow, wants regular diet  Plan:   -OK upgrade diet to regular solids, thin liquids  -crush meds, serve in applesauce/pudding/yogurt  -supervise at meals  -dietary to follow, supplements PRN  -if aspirating may need to  downgrade diet again    (Z51.5) Hospice care patient  Comment: [facility]  Plan:  -start morphine 5mg solutab PRN  -other meds reviewed as appropriate  -hospice nurse to visit 1-2x/wk and PRN  -monitor weights  -plan to follow up RE: status and meds in 1-2 weeks    MED REC REQUIRED  Post Medication Reconciliation Status: medication reconcilation previously completed during another office visit        Electronically signed by: BRAYDEN Storey CNP

## 2024-07-22 NOTE — LETTER
7/22/2024      Johnson Acosta  C/o Elder Acosta  [address]  [place] [address]        [facility] GERIATRICS    Chief Complaint   Patient presents with     RECHECK     HPI:  Johnson Acosta is a 89 year old  (5/22/1929), who is being seen today for an episodic care visit at: [facility] () [[id number]]. Today's concern is:   1. Cerebrovascular accident (CVA), unspecified mechanism (H)    2. Dysphagia, unspecified type    3. Hospice care patient      Patient post CVA with progressive decline, recent hallucinations and unresponsive episode, signed on with [facility] 7/19, meds reviewed, also having dysphagia with coughing spells at meals, today adamant to have diet changed even with potential aspiration risk, overall appears healthy.    Allergies, and PMH/PSH reviewed in EPIC today.  REVIEW OF SYSTEMS:  4 point ROS including Respiratory, CV, GI and , other than that noted in the HPI,  is negative    Objective:   /63   Pulse 64   Temp 97.5  F (36.4  C)   Resp 15   Wt 75.7 kg (166 lb 12.8 oz)   SpO2 98%   BMI 24.63 kg/m    GENERAL APPEARANCE:  in no distress, appears healthy  ENT:  Mouth and posterior oropharynx normal, moist mucous membranes  RESP:  lungs clear to auscultation , no respiratory distress  CV:  no edema, rate-normal  ABDOMEN:  bowel sounds normal  M/S:   Gait and station abnormal unsteady  SKIN:  Inspection of skin and subcutaneous tissue baseline  NEURO:   Examination of sensation by touch normal  PSYCH:  affect and mood normal    Labs done in SNF are in [facility]. Please refer to them using [employer]/Care Everywhere.    Assessment/Plan:  (I63.9) Cerebrovascular accident (CVA), unspecified mechanism (H)  (primary encounter diagnosis)  (R13.10) Dysphagia, unspecified type  Comment: cognitive limitations, hallucinating, cough with swallow, wants regular diet  Plan:   -OK upgrade diet to regular solids, thin liquids  -crush meds, serve in  applesauce/pudding/yogurt  -supervise at meals  -dietary to follow, supplements PRN  -if aspirating may need to downgrade diet again    (Z51.5) Hospice care patient  Comment: [facility]  Plan:  -start morphine 5mg solutab PRN  -other meds reviewed as appropriate  -hospice nurse to visit 1-2x/wk and PRN  -monitor weights  -plan to follow up RE: status and meds in 1-2 weeks    MED REC REQUIRED  Post Medication Reconciliation Status: medication reconcilation previously completed during another office visit        Electronically signed by: BRAYDEN Storey CNP          Sincerely,        BRAYDEN Storey CNP

## 2024-08-05 NOTE — PROGRESS NOTES
HISTORY OF PRESENT ILLNESS:  Johnson is a 89 year old male, (5/22/1929), is being seen today at [facility] for routine visit.  His main concern is itching on his abdomen.  He currently is on hydroxyzine and Aquaphor.  His diet had been liberalized.  He has no concerns about his diet right now.  He denies choking.  He denies chest pain or shortness of breath.  He continues on hospice.      Past Medical History/  Patient Active Problem List    Diagnosis Date Noted    Hospice care patient 07/22/2024     Priority: Medium    Edema, unspecified type 06/20/2024     Priority: Medium    Itching 04/18/2024     Priority: Medium    Major depressive disorder in partial remission, unspecified whether recurrent (H24) 04/04/2024     Priority: Medium    Generalized pain 03/04/2024     Priority: Medium    Insomnia, unspecified type 02/26/2024     Priority: Medium    Leukocytosis, unspecified type 01/25/2024     Priority: Medium    Generalized muscle weakness 01/25/2024     Priority: Medium    Left hemiplegia (H) 01/25/2024     Priority: Medium    History of CVA with residual deficit 01/25/2024     Priority: Medium    Paroxysmal atrial fibrillation (H) 08/17/2020     Priority: Medium    Sensorineural hearing loss, bilateral 08/12/2020     Priority: Medium    Basal cell carcinoma of ear 11/28/2011     Priority: Medium    Esophageal hiatus hernia 03/28/2011     Priority: Medium    Elevated PSA 03/22/2010     Priority: Medium     Prostate bx neg      Rosacea 03/10/2009     Priority: Medium    Anxiety state 07/05/2005     Priority: Medium    Essential hypertension 07/05/2005     Priority: Medium    Hyperlipidemia 07/05/2005     Priority: Medium         Current Outpatient Medications   Medication Sig Dispense Refill    acetaminophen (TYLENOL) 500 MG tablet Take 1,000 mg by mouth 3 times daily      albuterol (PROAIR HFA/PROVENTIL HFA/VENTOLIN HFA) 108 (90 Base) MCG/ACT inhaler Inhale 1-2 puffs into the lungs every 4 hours as  "needed for shortness of breath, wheezing or cough      amLODIPine (NORVASC) 10 MG tablet Take 10 mg by mouth daily      aspirin (ASA) 325 MG tablet Take 1 tablet (325 mg) by mouth daily      atorvastatin (LIPITOR) 20 MG tablet Take 40 mg by mouth daily      guaiFENesin (ROBITUSSIN) 20 mg/mL liquid Take 200 mg by mouth every 4 hours as needed for cough      hydrOXYzine HCl (ATARAX) 25 MG tablet Take 2 tablets (50 mg) by mouth 4 times daily      lisinopril (ZESTRIL) 10 MG tablet Take 1 tablet (10 mg) by mouth daily      metoprolol tartrate (LOPRESSOR) 25 MG tablet Take 25 mg by mouth 2 times daily      morphine 5 MG solu-tab Take 1 tablet (5 mg) by mouth every 4 hours as needed for shortness of breath or moderate to severe pain      oxymetazoline (AFRIN) 0.05 % nasal spray Spray 0.2 mLs (2 sprays) into both nostrils 2 times daily as needed for congestion      polyethylene glycol (MIRALAX) 17 GM/Dose powder Take 1 Capful by mouth daily as needed for constipation      traZODone (DESYREL) 50 MG tablet Take 25 mg by mouth at bedtime       No current facility-administered medications for this visit.       OBJECTIVE:  /63   Pulse 64   Temp 97.5  F (36.4  C)   Resp 15   Ht 1.753 m (5' 9\")   Wt 74.4 kg (164 lb)   SpO2 98%   BMI 24.22 kg/m    Physical Exam  Constitutional:       General: He is not in acute distress.  Cardiovascular:      Rate and Rhythm: Normal rate and regular rhythm.      Heart sounds: No murmur heard.  Pulmonary:      Effort: Pulmonary effort is normal. No respiratory distress.      Breath sounds: Normal breath sounds. No wheezing or rales.   Abdominal:      General: Bowel sounds are normal. There is no distension.      Palpations: Abdomen is soft.      Tenderness: There is no abdominal tenderness.   Skin:     General: Skin is warm and dry.      Comments: On his abdomen he has several excoriations.  No pustules or blisters.   Neurological:      Mental Status: He is alert.   Psychiatric:         " Mood and Affect: Mood normal.         Behavior: Behavior normal.          ASSESSMENT/PLAN:    Left hemiplegia (H)  Secondary to stroke.  Continue supportive cares.    Paroxysmal atrial fibrillation (H)  Rate controlled with metoprolol.  Continues on aspirin.    Essential hypertension  Well-controlled on metoprolol and amlodipine and lisinopril    Itching  Currently has Aquaphor and scheduled hydroxyzine.  He is itching quite a bit during my exam.  Will try 10 days of triamcinolone cream to see if this helps              Danielle Randall MD

## 2024-08-05 NOTE — ASSESSMENT & PLAN NOTE
Currently has Aquaphor and scheduled hydroxyzine.  He is itching quite a bit during my exam.  Will try 10 days of triamcinolone cream to see if this helps

## 2024-08-08 NOTE — PROGRESS NOTES
[facility] GERIATRICS    Chief Complaint   Patient presents with    RECHECK     HPI:  Johnson Acosta is a 89 year old  (5/22/1929), who is being seen today for an episodic care visit at: [facility] () [[id number]]. Today's concern is:   1. Cerebrovascular accident (CVA), unspecified mechanism (H)    2. Dysphagia, unspecified type    3. Itching    4. Hospice care patient      Patient seen for follow up, post CVA with severe L hemiplegia, now with [facility], cycles from fairly cognizant to confused with hallucinations, swallowing difficulties, needs meds crushed, also newer itchy/rashy area on abdomen now pink and irritated with intact skin, overall appears healthy, not thriving.    Allergies, and PMH/PSH reviewed in [facility] today.  REVIEW OF SYSTEMS:  4 point ROS including Respiratory, CV, GI and , other than that noted in the HPI,  is negative    Objective:   /56   Pulse 66   Temp 97.8  F (36.6  C)   Resp 15   Wt 74.4 kg (164 lb)   SpO2 91%   BMI 24.22 kg/m    GENERAL APPEARANCE:  in no distress, appears healthy  ENT:  Mouth and posterior oropharynx normal, moist mucous membranes  RESP:  lungs clear to auscultation , no respiratory distress  CV:  no edema, rate-normal  ABDOMEN:  no guarding or rebound  M/S:   Gait and station abnormal transfer assist  SKIN:  Inspection of skin and subcutaneous tissue baseline  NEURO:   Examination of sensation by touch normal  PSYCH:  affect and mood normal    Patient is on hospice/palliative care and labs are not recommended    Assessment/Plan:  (I63.9) Cerebrovascular accident (CVA), unspecified mechanism (H)  (primary encounter diagnosis)  Comment: post CVA, L hemiplegia  Plan: continue ASA and statin for now    (R13.10) Dysphagia, unspecified type  Comment: choke/cough with swallowing  Plan: change intake to mechanical soft, nectar thick  -OK crush meds    (L29.9) Itching  Comment: newer abdomen rashy  Plan: start TMC cream BID until  healed    (Z51.5) Hospice care patient  Comment: [facility]  Plan:   -add morphine 5mg scheduled at bedtime  -hospice nurse to visit 1-2x/wk and PRN  -current medications reviewed as appropriate  -plan to follow up RE: status and plan in 1-2 weeks    MED REC REQUIRED  Post Medication Reconciliation Status: medication reconcilation previously completed during another office visit      Electronically signed by: BRAYDEN Storey CNP

## 2024-08-08 NOTE — LETTER
8/8/2024      Johnson Acosta  C/o Elder Acosta  [address]  [place] [address]        [facility]    Chief Complaint   Patient presents with     RECHECK     HPI:  Johnson Acosta is a 89 year old  (5/22/1929), who is being seen today for an episodic care visit at: [facility] () [[id number]]. Today's concern is:   1. Cerebrovascular accident (CVA), unspecified mechanism (H)    2. Dysphagia, unspecified type    3. Itching    4. Hospice care patient      Patient seen for follow up, post CVA with severe L hemiplegia, now with [facility], cycles from fairly cognizant to confused with hallucinations, swallowing difficulties, needs meds crushed, also newer itchy/rashy area on abdomen now pink and irritated with intact skin, overall appears healthy, not thriving.    Allergies, and PMH/PSH reviewed in EPIC today.  REVIEW OF SYSTEMS:  4 point ROS including Respiratory, CV, GI and , other than that noted in the HPI,  is negative    Objective:   /56   Pulse 66   Temp 97.8  F (36.6  C)   Resp 15   Wt 74.4 kg (164 lb)   SpO2 91%   BMI 24.22 kg/m    GENERAL APPEARANCE:  in no distress, appears healthy  ENT:  Mouth and posterior oropharynx normal, moist mucous membranes  RESP:  lungs clear to auscultation , no respiratory distress  CV:  no edema, rate-normal  ABDOMEN:  no guarding or rebound  M/S:   Gait and station abnormal transfer assist  SKIN:  Inspection of skin and subcutaneous tissue baseline  NEURO:   Examination of sensation by touch normal  PSYCH:  affect and mood normal    Patient is on hospice/palliative care and labs are not recommended    Assessment/Plan:  (I63.9) Cerebrovascular accident (CVA), unspecified mechanism (H)  (primary encounter diagnosis)  Comment: post CVA, L hemiplegia  Plan: continue ASA and statin for now    (R13.10) Dysphagia, unspecified type  Comment: choke/cough with swallowing  Plan: change intake to mechanical soft, nectar  thick  -OK crush meds    (L29.9) Itching  Comment: newer abdomen rashy  Plan: start TMC cream BID until healed    (Z51.5) Hospice care patient  Comment: [facility]  Plan:   -add morphine 5mg scheduled at bedtime  -hospice nurse to visit 1-2x/wk and PRN  -current medications reviewed as appropriate  -plan to follow up RE: status and plan in 1-2 weeks    MED REC REQUIRED  Post Medication Reconciliation Status: medication reconcilation previously completed during another office visit      Electronically signed by: BRAYDEN Storey CNP          Sincerely,        BRAYDEN Storey CNP

## 2024-08-15 PROBLEM — K59.01 SLOW TRANSIT CONSTIPATION: Status: ACTIVE | Noted: 2024-01-01

## 2024-08-15 NOTE — PROGRESS NOTES
[facility] GERIATRICS    Chief Complaint   Patient presents with    RECHECK     HPI:  Johnson Acosta is a 89 year old  (5/22/1929), who is being seen today for an episodic care visit at: [facility] () [[id number]]. Today's concern is:   1. Cerebrovascular accident (CVA), unspecified mechanism (H)    2. Left hemiplegia (H)    3. Slow transit constipation    4. Hospice care patient      Patient seen for follow up, post CVA with severe hemiplegia, ADL assist, certain s/s FTT with lethargy, confusion, weight loss, limited appetite, generally pleasant with intermittent confusion, appears healthy, having firm stools even with morphine on board, followed by [facility].    Allergies, and PMH/PSH reviewed in [facility] today.  REVIEW OF SYSTEMS:  Limited secondary to cognitive impairment but today pt reports feeling OK    Objective:   /52   Pulse 79   Temp 97.1  F (36.2  C)   Resp 17   Wt 74.4 kg (164 lb)   SpO2 95%   BMI 24.22 kg/m    GENERAL APPEARANCE:  in no distress, appears healthy  ENT:  Mouth and posterior oropharynx normal, moist mucous membranes  RESP:  lungs clear to auscultation , no respiratory distress  CV:  peripheral edema mild+ in lower legs, rate-normal  ABDOMEN:  bowel sounds normal  M/S:   Gait and station abnormal transfer assist  SKIN:  Inspection of skin and subcutaneous tissue baseline  NEURO:   Examination of sensation by touch normal  PSYCH:  affect and mood normal    Patient is on hospice/palliative care and labs are not recommended    Assessment/Plan:  (I63.9) Cerebrovascular accident (CVA), unspecified mechanism (H)  (primary encounter diagnosis)  (G81.94) Left hemiplegia (H)  Comment: CVA in Jan 2024, severe hemiplegia, weight loss  Plan:   -continue ASA and statin for now  -vitals PRN  -staff to assist as indicated    (K59.01) Slow transit constipation  Comment: reports firm stool  Plan: change senna to 1 tab BID plus PRN  -continue miralax PRN  -monitor stool  output    (Z51.5) Hospice care patient  Comment: [facility]  Plan:   -change morphine to 5mg QID plus Q4h PRN  -continue current meds, reviewed as appropriate  -hospice nurse to visit 1-2x/wk and PRN  -plan to follow up RE: status and plan in 1-2 weeks    MED REC REQUIRED  Post Medication Reconciliation Status: medication reconcilation previously completed during another office visit        Electronically signed by: BRAYDEN Storey CNP

## 2024-08-15 NOTE — LETTER
8/15/2024      Johnson Acosta  C/o Elder Acosta  [address] [facility] [address]        [facility] GERIATRICS    Chief Complaint   Patient presents with     RECHECK     HPI:  Johnson Acosta is a 89 year old  (5/22/1929), who is being seen today for an episodic care visit at: [facility] () [[id number]]. Today's concern is:   1. Cerebrovascular accident (CVA), unspecified mechanism (H)    2. Left hemiplegia (H)    3. Slow transit constipation    4. Hospice care patient      Patient seen for follow up, post CVA with severe hemiplegia, ADL assist, certain s/s FTT with lethargy, confusion, weight loss, limited appetite, generally pleasant with intermittent confusion, appears healthy, having firm stools even with morphine on board, followed by [facility].    Allergies, and PMH/PSH reviewed in [facility] today.  REVIEW OF SYSTEMS:  Limited secondary to cognitive impairment but today pt reports feeling OK    Objective:   /52   Pulse 79   Temp 97.1  F (36.2  C)   Resp 17   Wt 74.4 kg (164 lb)   SpO2 95%   BMI 24.22 kg/m    GENERAL APPEARANCE:  in no distress, appears healthy  ENT:  Mouth and posterior oropharynx normal, moist mucous membranes  RESP:  lungs clear to auscultation , no respiratory distress  CV:  peripheral edema mild+ in lower legs, rate-normal  ABDOMEN:  bowel sounds normal  M/S:   Gait and station abnormal transfer assist  SKIN:  Inspection of skin and subcutaneous tissue baseline  NEURO:   Examination of sensation by touch normal  PSYCH:  affect and mood normal    Patient is on hospice/palliative care and labs are not recommended    Assessment/Plan:  (I63.9) Cerebrovascular accident (CVA), unspecified mechanism (H)  (primary encounter diagnosis)  (G81.94) Left hemiplegia (H)  Comment: CVA in Jan 2024, severe hemiplegia, weight loss  Plan:   -continue ASA and statin for now  -vitals PRN  -staff to assist as indicated    (K59.01) Slow transit  constipation  Comment: reports firm stool  Plan: change senna to 1 tab BID plus PRN  -continue miralax PRN  -monitor stool output    (Z51.5) Hospice care patient  Comment: [facility]  Plan:   -change morphine to 5mg QID plus Q4h PRN  -continue current meds, reviewed as appropriate  -hospice nurse to visit 1-2x/wk and PRN  -plan to follow up RE: status and plan in 1-2 weeks    MED REC REQUIRED  Post Medication Reconciliation Status: medication reconcilation previously completed during another office visit        Electronically signed by: BRAYDEN Storey CNP          Sincerely,        BRAYDEN Storey CNP

## 2024-08-20 NOTE — TELEPHONE ENCOUNTER
Request came through for Lorazepam.  Patient has never been on this medications here.  Patient is a patient at [facility].  He is followed there for medications as well.  RN did call [facility] to discuss this. They state he is a patient there and provider there does prescribe. They get medications through their pharmacy. She is thinking this request was sent to us in error. Evelyn is going to do some checking and call this nurse back.

## 2024-08-20 NOTE — TELEPHONE ENCOUNTER
THONG Rivas did call back. Stated to disregard this encounter. Refill request/fill was sent to the correct pharmacy already.  Will close this encounter.

## 2024-09-05 NOTE — TELEPHONE ENCOUNTER
INCOMING FORMS    Sender: [facility]    Type of Form, letter or note (What is requested?): poc and orders x2.     How was the form received?: Fax    How should forms be returned?:  Fax : [phone]    Form placed in TC bin for review/signature if appropriate.

## 2024-09-19 ENCOUNTER — MEDICAL CORRESPONDENCE (OUTPATIENT)
Dept: HEALTH INFORMATION MANAGEMENT | Facility: CLINIC | Age: 89
End: 2024-09-19
Payer: MEDICARE

## 2024-09-26 ENCOUNTER — PATIENT OUTREACH (OUTPATIENT)
Dept: GERIATRIC MEDICINE | Facility: CLINIC | Age: 89
End: 2024-09-26
Payer: MEDICARE

## 2024-09-26 NOTE — PROGRESS NOTES
Notified by E and E that member passed away on 9/9/24 at [facility].    PCP notified. Called all providers to cancel services.    Optional: Called family member/caregiver to offer condolences.   For [facility]:  Death Notification form completed and faxed to [facility].    Chart reviewed and this CC's encounter closed. Chart handed off to CMS to process disenrollment tasks.    MARISOL Martinez, RN, PHN, [facility]  Care Coordinator-Long Term Care  [address] [address]  [email or web address]   [email or web address]     Office: [phone]   Fax: [phone]